# Patient Record
Sex: MALE | Race: WHITE | NOT HISPANIC OR LATINO | Employment: OTHER | URBAN - METROPOLITAN AREA
[De-identification: names, ages, dates, MRNs, and addresses within clinical notes are randomized per-mention and may not be internally consistent; named-entity substitution may affect disease eponyms.]

---

## 2017-10-10 DIAGNOSIS — Z00.00 ROUTINE GENERAL MEDICAL EXAMINATION AT A HEALTH CARE FACILITY: Primary | ICD-10-CM

## 2017-11-30 ENCOUNTER — HOSPITAL ENCOUNTER (OUTPATIENT)
Dept: CARDIOLOGY | Facility: CLINIC | Age: 67
Discharge: HOME | End: 2017-11-30
Payer: COMMERCIAL

## 2017-11-30 ENCOUNTER — OFFICE VISIT (OUTPATIENT)
Dept: INTERNAL MEDICINE | Facility: CLINIC | Age: 67
End: 2017-11-30
Payer: COMMERCIAL

## 2017-11-30 ENCOUNTER — CLINICAL SUPPORT (OUTPATIENT)
Dept: INTERNAL MEDICINE | Facility: CLINIC | Age: 67
End: 2017-11-30
Payer: COMMERCIAL

## 2017-11-30 VITALS
SYSTOLIC BLOOD PRESSURE: 125 MMHG | DIASTOLIC BLOOD PRESSURE: 80 MMHG | WEIGHT: 231.5 LBS | HEIGHT: 75 IN | BODY MASS INDEX: 28.78 KG/M2 | HEART RATE: 84 BPM

## 2017-11-30 DIAGNOSIS — Z00.00 ANNUAL PHYSICAL EXAM: Primary | ICD-10-CM

## 2017-11-30 DIAGNOSIS — Z00.00 ROUTINE GENERAL MEDICAL EXAMINATION AT A HEALTH CARE FACILITY: ICD-10-CM

## 2017-11-30 DIAGNOSIS — Z00.00 ROUTINE GENERAL MEDICAL EXAMINATION AT A HEALTH CARE FACILITY: Primary | ICD-10-CM

## 2017-11-30 PROBLEM — Z28.21 PNEUMOCOCCAL VACCINATION DECLINED: Status: ACTIVE | Noted: 2017-11-30

## 2017-11-30 LAB
ALBUMIN SERPL BCP-MCNC: 3.7 G/DL
ALP SERPL-CCNC: 52 U/L
ALT SERPL W/O P-5'-P-CCNC: 22 U/L
ANION GAP SERPL CALC-SCNC: 10 MMOL/L
AST SERPL-CCNC: 21 U/L
BILIRUB SERPL-MCNC: 0.7 MG/DL
BUN SERPL-MCNC: 13 MG/DL
CALCIUM SERPL-MCNC: 9.6 MG/DL
CHLORIDE SERPL-SCNC: 108 MMOL/L
CHOLEST SERPL-MCNC: 145 MG/DL
CHOLEST/HDLC SERPL: 2.9 {RATIO}
CO2 SERPL-SCNC: 28 MMOL/L
COMPLEXED PSA SERPL-MCNC: 0.57 NG/ML
CREAT SERPL-MCNC: 1 MG/DL
DIASTOLIC DYSFUNCTION: NO
ERYTHROCYTE [DISTWIDTH] IN BLOOD BY AUTOMATED COUNT: 12.5 %
EST. GFR  (AFRICAN AMERICAN): >60 ML/MIN/1.73 M^2
EST. GFR  (NON AFRICAN AMERICAN): >60 ML/MIN/1.73 M^2
ESTIMATED AVG GLUCOSE: 97 MG/DL
GLUCOSE SERPL-MCNC: 101 MG/DL
HBA1C MFR BLD HPLC: 5 %
HCT VFR BLD AUTO: 46.5 %
HCV AB SERPL QL IA: NEGATIVE
HDLC SERPL-MCNC: 50 MG/DL
HDLC SERPL: 34.5 %
HGB BLD-MCNC: 15.3 G/DL
LDLC SERPL CALC-MCNC: 81 MG/DL
MCH RBC QN AUTO: 31.3 PG
MCHC RBC AUTO-ENTMCNC: 32.9 G/DL
MCV RBC AUTO: 95 FL
NONHDLC SERPL-MCNC: 95 MG/DL
PLATELET # BLD AUTO: 168 K/UL
PMV BLD AUTO: 9.7 FL
POTASSIUM SERPL-SCNC: 4.4 MMOL/L
PROT SERPL-MCNC: 7.3 G/DL
RBC # BLD AUTO: 4.89 M/UL
SODIUM SERPL-SCNC: 146 MMOL/L
TRIGL SERPL-MCNC: 70 MG/DL
TSH SERPL DL<=0.005 MIU/L-ACNC: 1.54 UIU/ML
WBC # BLD AUTO: 5.54 K/UL

## 2017-11-30 PROCEDURE — 99999 PR PBB SHADOW E&M-EST. PATIENT-LVL III: CPT | Mod: PBBFAC,,, | Performed by: INTERNAL MEDICINE

## 2017-11-30 PROCEDURE — 83036 HEMOGLOBIN GLYCOSYLATED A1C: CPT

## 2017-11-30 PROCEDURE — 93015 CV STRESS TEST SUPVJ I&R: CPT | Mod: S$GLB,,, | Performed by: INTERNAL MEDICINE

## 2017-11-30 PROCEDURE — 84153 ASSAY OF PSA TOTAL: CPT

## 2017-11-30 PROCEDURE — 85027 COMPLETE CBC AUTOMATED: CPT

## 2017-11-30 PROCEDURE — 86803 HEPATITIS C AB TEST: CPT

## 2017-11-30 PROCEDURE — 80053 COMPREHEN METABOLIC PANEL: CPT

## 2017-11-30 PROCEDURE — 84443 ASSAY THYROID STIM HORMONE: CPT

## 2017-11-30 PROCEDURE — 99397 PER PM REEVAL EST PAT 65+ YR: CPT | Mod: S$GLB,,, | Performed by: INTERNAL MEDICINE

## 2017-11-30 PROCEDURE — 80061 LIPID PANEL: CPT

## 2017-11-30 NOTE — LETTER
"November 30, 2017        Jeremie Fitzpatrick MD  [address] [facility] [place] [address]             [facility] - Internal Medicine  [address]  [facility] [address]  Phone: [phone]  Fax: [phone]   Patient: Rakesh Lawrence   MR Number: [id number]   YOB: 1950   Date of Visit: 11/30/2017       Dear Dr. Fitzpatrick:    Thank you for referring Rakesh Lawrence to me for evaluation. Attached you will find relevant portions of my assessment and plan of care.    Of major importance is his stress test which revealed "At peak exercise, EKG revealed < 1mm of upsloping ST segment depression in the inferolateral leads. There were PACs and PVCs  and immediately in recovery a 10 beat run of wide complex tachycardia."    In the body of the report it indicates that one couldconsider checking electrolytes and thyroid if not done (those were done today, acceptable).    Whether Mr. Lawrence should have a CFD or Holter or EP assessment is something he would like to discuss with you.    If you have questions, please do not hesitate to call me. I look forward to following Rakesh Lawrence along with you.    Sincerely,          Naheed Rodriguez MD, FACP            CC  No Recipients    Enclosure         "

## 2017-11-30 NOTE — PROGRESS NOTES
Subjective:       Patient ID: Rakesh Lawrence is a 67 y.o. male.    Chief Complaint: Executive Health     PE    Had a colonoscopy 2/17- will be repeated in 3 years.    Declines flu shot.  Shingles vaccines discussed; he will wait on that.  He also defers on Prevnar and Tdap.    ETT today acceptable- however, did have a run of nonsustained V. tach.  This was reviewed.  He sees an outside cardiologist and would like to discuss this with his cardiologist.    Labs pending.     Patient Active Problem List:     Abnormal stress echocardiogram     Mixed hyperlipidemia     Paroxysmal VT     Cholelithiasis 2007 see ultrasound here no sx     Colon polyp: adenoma 2007; repeated wnl 1/11 in [place] also wnl in 2014, 2017     Elevated bilirubin since 2002 (1.3)     TIA (transient ischemic attack) 2014 workup in [place] negative        Review of Systems   Constitutional: Negative.    HENT: Negative for congestion, postnasal drip, rhinorrhea and sinus pressure.    Eyes: Negative for redness and visual disturbance.   Respiratory: Negative for apnea, choking, shortness of breath and stridor.    Cardiovascular: Negative for chest pain, palpitations and leg swelling.   Gastrointestinal: Negative for abdominal pain, constipation, diarrhea and nausea.   Genitourinary: Negative for difficulty urinating, flank pain, frequency, testicular pain and urgency.   Musculoskeletal: Negative for arthralgias, back pain and myalgias.   Skin: Negative for color change and rash.        No major issues- sees DERM annually in [place] and is up to date   Neurological: Negative.    Psychiatric/Behavioral: Negative.        Objective:      Physical Exam   Constitutional: He is oriented to person, place, and time. He appears well-developed and well-nourished.   HENT:   Head: Normocephalic and atraumatic.   Right Ear: External ear normal.   Left Ear: External ear normal.   Eyes: Conjunctivae and EOM are normal. Pupils are equal, round, and reactive to light.   Neck:  Normal range of motion. Neck supple. No thyromegaly present.   Cardiovascular: Normal rate and regular rhythm.    No murmur heard.  Pulmonary/Chest: Effort normal and breath sounds normal. No respiratory distress. He has no wheezes.   Abdominal: Soft. He exhibits no distension. There is no tenderness.   Musculoskeletal: He exhibits no edema or tenderness.   Lymphadenopathy:     He has no cervical adenopathy.   Neurological: He is alert and oriented to person, place, and time. No cranial nerve deficit.   Skin: Skin is warm and dry.   Psychiatric: He has a normal mood and affect. His behavior is normal.       Assessment:       1. Annual physical exam        Plan:         continue current medical regimen  Follow-up with cardiologist regarding ETT  Attempt at 5-10 point weight loss in the next year    Immunizations recommended, he currently declines

## 2017-11-30 NOTE — LETTER
November 30, 2017    Rakesh Lawrence  [address] [place] [address]  [address]  [place] [address]             [facility] - Internal Medicine  [address]  [facility] [address]  Phone: [phone]  Fax: [phone] Dear Mr. Lawrence:    Thank you for allowing me to serve you and perform your Executive Health exam on 11/30/2017.  This letter will serve a brief summary of the history, physical findings, and laboratory/studies performed and recommendations at that time.    Reason for Visit: Executive Health Preventive Physical Examination    Past Medical History:   Diagnosis Date    Cholelithiasis 2011 12/5/2013    Colon polyp: adenoma 2007; repeated wnl 1/11 in [place] due in 2014 12/5/2013    Elevated bilirubin since 2002 (1.3) 12/5/2013    Paroxysmal VT 1/4/2013    TIA (transient ischemic attack) 2014 workup in [place] negative 11/13/2014       Past Surgical History:   Procedure Laterality Date    EYE SURGERY      TONSILLECTOMY         Family History   Problem Relation Age of Onset    Heart disease Mother     Stroke Mother     Hypertension Mother     Cancer Father      lung     Cancer Sister      ? lung    COPD Sister     No Known Problems Daughter     No Known Problems Daughter     No Known Problems Daughter     Stroke Paternal Uncle     Hypertension Paternal Uncle             Review of patient's allergies indicates:  No Known Allergies      Current Outpatient Prescriptions:     aspirin (ECOTRIN) 81 MG EC tablet, Take 81 mg by mouth once daily.  , Disp: , Rfl:     clopidogrel (PLAVIX) 75 mg tablet, Take 75 mg by mouth once daily., Disp: , Rfl:     pravastatin (PRAVACHOL) 80 MG tablet, Take 1 tablet (80 mg total) by mouth once daily., Disp: 90 tablet, Rfl: 3     Review of Systems  Review of Systems - Negative     Physical Exam:  General: General appearance: alert, well appearing, and in no distress.   Skin: Skin exam - normal coloration and turgor, no rashes, no suspicious skin lesions noted.  HEENT:  Ears - bilateral TM's and external ear canals normal. , ENT exam reveals - ENT exam normal, no neck nodes or sinus tenderness.   Lungs: Chest: clear to auscultation, no wheezes, rales or rhonchi, symmetric air entry.   Heart: CVS exam: normal rate, regular rhythm, normal S1, S2, no murmurs, rubs, clicks or gallops.   Extremities: Exam of extremities: peripheral pulses normal, no pedal edema, no clubbing or cyanosis    Labs:  Acceptable.    As we discussed, stress test was acceptable other than in the recovery phase you had a short burst of arrhythmia.  This is similar to what you experience many years ago.  I would encourage you to review this with your cardiologist.  It may be valuable to consider a 24-48 hour Holter monitor and a visit with an arrhythmia specialist.  If you'd like to have that done here at [facility], please let me know.     Assessment/Recommendations:  Routine Health Maintenance is up-to-date.  I recommend a pneumonia vaccine series, an annual flu shot, a tetanus diphtheria pertussis booster, and a shingles vaccine (we discussed that there is a new shingles vaccine about to be available and that it is worth waiting for that new version.)    At this time, you appear to be in good medical condition.  I look forward to seeing you again next year.  Please continue to work on a healthy plant-based eating plan and regular exercise with an attempt at 5-10 pounds weight loss in the next year.  Please keep up with her regular dermatology and cardiology assessments.      Please contact me should you have any questions or concerns regarding physical findings, or my recommendations.        Sincerely,          Naheed Rodriguez MD, FACP

## 2017-11-30 NOTE — LETTER
November 30, 2017        No Recipients             [facility] - [facility] Medicine  [address]  [facility] [address]  Phone: [phone]  Fax: [phone]   Patient: Rakesh Lawrence   MR Number: [id number]   YOB: 1950   Date of Visit: 11/30/2017       Dear Dr. Posada Recipients:    Thank you for referring Rakesh Lawrence to me for evaluation. Attached you will find relevant portions of my assessment and plan of care.    If you have questions, please do not hesitate to call me. I look forward to following Rakesh Lawrence along with you.    Sincerely,      Naheed Rodriguez MD            CC  No Recipients    Enclosure

## 2017-11-30 NOTE — PATIENT INSTRUCTIONS
Prevention Guidelines, Men Ages 65 and Older  Screening tests and vaccines are an important part of managing your health. Health counseling is essential, too. Below are guidelines for these, for men ages 65 and older. Talk with your healthcare provider to make sure youre up-to-date on what you need.  Screening Who needs it How often   Abdominal aortic aneurysm Men ages 65 to 75 who have ever smoked 1 ultrasound   Alcohol misuse All men in this age group At routine exams   Blood pressure All men in this age group Every 2 years if your blood pressure is less than 120/80 mm Hg; yearly if your systolic blood pressure is 120 to 139 mm Hg, or your diastolic blood pressure reading is 80 to 89 mm Hg   Colorectal cancer All men in this age group Flexible sigmoidoscopy every 5 years, or colonoscopy every 10 years, or double-contrast barium enema every 5 years; yearly fecal occult blood test or fecal immunochemical test; or a stool DNA test as often as your healthcare provider advises; talk with your healthcare provider about which tests are best for you and when you no longer need colonoscopies (generally after age 75)   Depression All men in this age group At routine exams   Type 2 diabetes or prediabetes All adults beginning at age 45 and adults without symptoms at any age who are overweight or obese and have 1 or more other risk factors for diabetes At least every 3 years (yearly if your blood sugar has already begun to rise)   Hepatitis C Men at increased risk for infection - talk with your healthcare provider At routine exams   High cholesterol or triglycerides All men in this age group At least every 5 years   HIV Men at increased risk for infection - talk with your healthcare provider At routine exams   Lung cancer Adults ages 55 to 80 who have smoked Yearly screening in smokers with 30 pack-year history of smoking or who quit within 15 years   Obesity All men in this age group At routine exams   Prostate cancer All  men in this age group, talk to healthcare provider about risks and benefits of digital rectal exam (GEORGE) and prostate-specific antigen (PSA) screening1 At routine exams   Syphilis Men at increased risk for infection - talk with your healthcare provider At routine exams   Tuberculosis Men at increased risk for infection - talk with your healthcare provider Ask your healthcare provider   Vision All men in this age group Every 1 to 2 years; if you have a chronic health condition, ask your healthcare provider if you needs exams more often   Vaccine Who needs it How often   Chickenpox (varicella) All men in this age group who have no record of this infection or vaccine 2 doses; second dose should be given at least 4 weeks after the first dose   Hepatitis A Men at increased risk for infection - talk with your healthcare provider 2 doses given at least 6 months apart   Hepatitis B Men at increased risk for infection - talk with your healthcare provider 3 doses over 6 months; second dose should be given 1 month after the first dose; the third dose should be given at least 2 months after the second dose and at least 4 months after the first dose   Haemophilus influenzae Type B (HIB) Men at increased risk for infection - talk with your healthcare provider 1 to 3 doses   Influenza (flu) All men in this age group  Once a year   Meningococcal Men at increased risk for infection - talk with your healthcare provider 1 or more doses   Pneumococcal conjugate vaccine (PCV13) and pneumococcal polysaccharide vaccine (PPSV23) All men in this age group 1 dose of each vaccine   Tetanus/diphtheria/  pertussis (Td/Tdap) booster All men in this age group Td every 10 years, or Tdap if you will have contact with a child younger than 12 months old   Zoster All men in this age group 1 dose   Counseling Who needs it How often   Diet and exercise Men who are overweight or obese When diagnosed, and then at routine exams   Fall prevention (exercise,  vitamin D supplements) All men in this age group At routine exams   Sexually transmitted infection Men at increased risk for infection - talk with your healthcare provider At routine exams   Use of daily aspirin Men ages 45 to 79 at risk for cardiovascular health problems At routine exams   Use of tobacco and the health effects it can cause All men in this age group Every visit   [address] [place] [address] [facility]   Date Last Reviewed: 2017  © 7120-1745 [employer]. [address] [place] [address], [place] [address]. All rights reserved. This information is not intended as a substitute for professional medical care. Always follow your healthcare professional's instructions.        Adult Immunization Schedule  Vaccine How Often Disease Prevented Who Needs It   Influenza Every year Flu. This can be especially dangerous to elderly adults or people with immune disorders. All adults   Tetanus, diphtheria (Td); or Tetanus, diphtheria, and pertussis (Tdap)* One dose of Tdap, then one dose of Td as a booster every 10 years Tetanus (lockjaw), a disease that causes muscles to spasm  Diphtheria, an infection that causes fever, weakness, and breathing problems  Pertussis, also known as whooping cough. This is a highly contagious disease that can cause serious illness. All adults  *This vaccine should be given during each pregnancy no matter how many years since the last vaccine. The vaccine increases protection for your . A  is too young to get the vaccine, but at the highest risk for severe illness and death from pertussis.   Varicella (Sky)** One series of 2 injections Chickenpox. This is a disease that causes itchy skin bumps, fever, and tiredness. It can lead to scarring, pneumonia, or brain inflammation. Adults who dont have evidence of immunity  **This vaccine should not be given to pregnant women. Women should avoid pregnancy for 4 weeks after the vaccine.   Human papillomavirus (HPV) One  series of 3 injections Cervical cancer, caused by certain types of HPV  Vaginal and vulvar cancer  Penile cancer  Head and neck cancers  Anal cancer  Genital warts Females and males ages 15 to 26  Ask your healthcare provider if this vaccine is right for you.   Zoster--- 1 dose Herpes zoster (shingles), a painful rash marked by blisters Adults ages 60 and older.  ---You should not get this vaccine if your immune system is low. For example, if you have HIV, are taking medicines that suppress your immune system, or are getting cancer treatment.   Measles, mumps, rubella (MMR)** 1 or 2 doses, for ages 19 through 49; 1 dose for ages 50 and older if at risk Measles, a disease marked by red spots, fever, and coughing  Mumps, a disease that causes swelling in the salivary glands. It may affect the ovaries or testes.  Rubella ([place]ian measles). This is a form of measles that can cause birth defects if a pregnant woman catches it. Adults born in 1957 or later who are not known to be immune to all 3 of these diseases. Ask your healthcare provider if you need a second dose.  **This vaccine should not be given to pregnant women. Women should avoid pregnancy for 4 weeks after vaccination.   Pneumococcal (PCV 13) 1 dose Pneumonia. This is an infection that causes inflammation in your lungs. It can lead to death. Adults ages 65 and older. Also, adults ages 19 and older with weak immune systems or any of these health conditions: chronic renal failure, nephrotic syndrome, functional or anatomic asplenia, cerebrospinal fluid (CSF) leaks, or cochlear implants. This vaccine adds extra protection to PCV 23 and should be given about 2 months before PCV 23.   Pneumococcal (PPSV23)  1 or 2 doses Pneumonia. This is an infection that causes inflammation in your lungs. It can lead to death. Adults ages 65 and older. Also, adults with chronic illnesses, such as asthma, COPD, heart disease, diabetes, liver disease, alcoholism, sickle cell  disease, or history of splenectomy. In addition, adults with an immune disorder and adults who smoke cigarettes. This vaccine is recommended for all adults regardless of immune status.   Meningococcal  (MCV or MPSV) 1 or more doses Meningococcal disease (bacterial meningitis). This is inflammation of the membrane covering the brain and spinal cord. It can lead to death. Adults with immune deficiencies or high risk of exposure. Also, college freshmen living in dormitories and  recruits.   Hepatitis A (HepA) One series of 2 injections Hepatitis A. This is an infection that can result in acute liver inflammation and yellow skin and whites of the eyes (jaundice). Adults with risk factors, such as clotting disorders or chronic liver disease, and adults with high risk of exposure. This includes men who have sex with men, IV drug users, and travelers to countries where hepatitis A is common.   Hepatitis B (HepB) One series of 3 injections Hepatitis B. This is an infection that causes chronic, severe liver disease. Adults with high risk of exposure, such as healthcare providers and sanitation workers, and adults with diabetes   Travelers diseases As needed Infections such as cholera, typhoid, yellow fever, polio, rabies, meningococcal disease, hepatitis A, hepatitis B Adults traveling out of the country. Required vaccines will vary, depending on the country you visit. Check the CDC website: www.cdc.gov.    ,  Based on the CDC National Immunization Program recommendations for adults.  Date Last Reviewed: 2/1/2017  © 7449-7835 [employer]. [address] [place] [address], [place] [address]. All rights reserved. This information is not intended as a substitute for professional medical care. Always follow your healthcare professional's instructions.

## 2018-10-16 DIAGNOSIS — Z00.00 ROUTINE GENERAL MEDICAL EXAMINATION AT A HEALTH CARE FACILITY: Primary | ICD-10-CM

## 2018-11-29 ENCOUNTER — OFFICE VISIT (OUTPATIENT)
Dept: INTERNAL MEDICINE | Facility: CLINIC | Age: 68
End: 2018-11-29
Payer: MEDICARE

## 2018-11-29 ENCOUNTER — CLINICAL SUPPORT (OUTPATIENT)
Dept: INTERNAL MEDICINE | Facility: CLINIC | Age: 68
End: 2018-11-29
Payer: MEDICARE

## 2018-11-29 ENCOUNTER — HOSPITAL ENCOUNTER (OUTPATIENT)
Dept: CARDIOLOGY | Facility: CLINIC | Age: 68
Discharge: HOME | End: 2018-11-29
Payer: MEDICARE

## 2018-11-29 VITALS
BODY MASS INDEX: 29.8 KG/M2 | HEIGHT: 73 IN | DIASTOLIC BLOOD PRESSURE: 70 MMHG | SYSTOLIC BLOOD PRESSURE: 118 MMHG | WEIGHT: 224.88 LBS

## 2018-11-29 DIAGNOSIS — E11.9 DIABETES MELLITUS WITHOUT COMPLICATION: ICD-10-CM

## 2018-11-29 DIAGNOSIS — E03.9 PRIMARY HYPOTHYROIDISM: ICD-10-CM

## 2018-11-29 DIAGNOSIS — Z00.00 ROUTINE GENERAL MEDICAL EXAMINATION AT A HEALTH CARE FACILITY: ICD-10-CM

## 2018-11-29 DIAGNOSIS — D64.9 ANEMIA, UNSPECIFIED TYPE: ICD-10-CM

## 2018-11-29 DIAGNOSIS — E78.5 HYPERLIPIDEMIA, UNSPECIFIED HYPERLIPIDEMIA TYPE: Primary | ICD-10-CM

## 2018-11-29 DIAGNOSIS — Z12.5 SPECIAL SCREENING FOR MALIGNANT NEOPLASM OF PROSTATE: ICD-10-CM

## 2018-11-29 DIAGNOSIS — Z00.00 ANNUAL PHYSICAL EXAM: Primary | ICD-10-CM

## 2018-11-29 PROBLEM — Z28.21 PNEUMOCOCCAL VACCINATION DECLINED: Status: RESOLVED | Noted: 2017-11-30 | Resolved: 2018-11-29

## 2018-11-29 LAB
ALBUMIN SERPL BCP-MCNC: 3.7 G/DL
ALP SERPL-CCNC: 52 U/L
ALT SERPL W/O P-5'-P-CCNC: 24 U/L
ANION GAP SERPL CALC-SCNC: 8 MMOL/L
AST SERPL-CCNC: 24 U/L
BILIRUB SERPL-MCNC: 1 MG/DL
BUN SERPL-MCNC: 13 MG/DL
CALCIUM SERPL-MCNC: 9.5 MG/DL
CHLORIDE SERPL-SCNC: 104 MMOL/L
CHOLEST SERPL-MCNC: 149 MG/DL
CHOLEST/HDLC SERPL: 3 {RATIO}
CO2 SERPL-SCNC: 27 MMOL/L
COMPLEXED PSA SERPL-MCNC: 0.48 NG/ML
CREAT SERPL-MCNC: 1 MG/DL
ERYTHROCYTE [DISTWIDTH] IN BLOOD BY AUTOMATED COUNT: 12.5 %
EST. GFR  (AFRICAN AMERICAN): >60 ML/MIN/1.73 M^2
EST. GFR  (NON AFRICAN AMERICAN): >60 ML/MIN/1.73 M^2
ESTIMATED AVG GLUCOSE: 97 MG/DL
GLUCOSE SERPL-MCNC: 96 MG/DL
HBA1C MFR BLD HPLC: 5 %
HCT VFR BLD AUTO: 46.7 %
HDLC SERPL-MCNC: 50 MG/DL
HDLC SERPL: 33.6 %
HGB BLD-MCNC: 15.5 G/DL
LDLC SERPL CALC-MCNC: 83.6 MG/DL
MCH RBC QN AUTO: 31.1 PG
MCHC RBC AUTO-ENTMCNC: 33.2 G/DL
MCV RBC AUTO: 94 FL
NONHDLC SERPL-MCNC: 99 MG/DL
PLATELET # BLD AUTO: 171 K/UL
PMV BLD AUTO: 10.5 FL
POTASSIUM SERPL-SCNC: 4.3 MMOL/L
PROT SERPL-MCNC: 7.1 G/DL
RBC # BLD AUTO: 4.99 M/UL
SODIUM SERPL-SCNC: 139 MMOL/L
TRIGL SERPL-MCNC: 77 MG/DL
TSH SERPL DL<=0.005 MIU/L-ACNC: 1.15 UIU/ML
WBC # BLD AUTO: 5.97 K/UL

## 2018-11-29 PROCEDURE — 93005 ELECTROCARDIOGRAM TRACING: CPT | Mod: PBBFAC | Performed by: INTERNAL MEDICINE

## 2018-11-29 PROCEDURE — 90662 IIV NO PRSV INCREASED AG IM: CPT | Mod: PBBFAC

## 2018-11-29 PROCEDURE — 93010 ELECTROCARDIOGRAM REPORT: CPT | Mod: S$PBB,,, | Performed by: INTERNAL MEDICINE

## 2018-11-29 PROCEDURE — 85027 COMPLETE CBC AUTOMATED: CPT

## 2018-11-29 PROCEDURE — 80061 LIPID PANEL: CPT

## 2018-11-29 PROCEDURE — 84443 ASSAY THYROID STIM HORMONE: CPT

## 2018-11-29 PROCEDURE — 80053 COMPREHEN METABOLIC PANEL: CPT

## 2018-11-29 PROCEDURE — 90670 PCV13 VACCINE IM: CPT | Mod: PBBFAC

## 2018-11-29 PROCEDURE — 99397 PER PM REEVAL EST PAT 65+ YR: CPT | Mod: S$PBB,,, | Performed by: INTERNAL MEDICINE

## 2018-11-29 PROCEDURE — 99999 PR PBB SHADOW E&M-EST. PATIENT-LVL III: CPT | Mod: PBBFAC,,, | Performed by: INTERNAL MEDICINE

## 2018-11-29 PROCEDURE — 84153 ASSAY OF PSA TOTAL: CPT

## 2018-11-29 PROCEDURE — 83036 HEMOGLOBIN GLYCOSYLATED A1C: CPT

## 2018-11-29 PROCEDURE — 99213 OFFICE O/P EST LOW 20 MIN: CPT | Mod: PBBFAC,25 | Performed by: INTERNAL MEDICINE

## 2018-11-29 NOTE — PATIENT INSTRUCTIONS
Prevention Guidelines, Men Ages 65 and Older  Screening tests and vaccines are an important part of managing your health. Health counseling is essential, too. Below are guidelines for these, for men ages 65 and older. Talk with your healthcare provider to make sure youre up-to-date on what you need.  Screening Who needs it How often   Abdominal aortic aneurysm Men ages 65 to 75 who have ever smoked 1 ultrasound   Alcohol misuse All men in this age group At routine exams   Blood pressure All men in this age group Every 2 years if your blood pressure is less than 120/80 mm Hg; yearly if your systolic blood pressure is 120 to 139 mm Hg, or your diastolic blood pressure reading is 80 to 89 mm Hg   Colorectal cancer All men in this age group Flexible sigmoidoscopy every 5 years, or colonoscopy every 10 years, or double-contrast barium enema every 5 years; yearly fecal occult blood test or fecal immunochemical test; or a stool DNA test as often as your healthcare provider advises; talk with your healthcare provider about which tests are best for you and when you no longer need colonoscopies (generally after age 75)   Depression All men in this age group At routine exams   Type 2 diabetes or prediabetes All adults beginning at age 45 and adults without symptoms at any age who are overweight or obese and have 1 or more other risk factors for diabetes At least every 3 years (yearly if your blood sugar has already begun to rise)   Hepatitis C Men at increased risk for infection - talk with your healthcare provider At routine exams   High cholesterol or triglycerides All men in this age group At least every 5 years   HIV Men at increased risk for infection - talk with your healthcare provider At routine exams   Lung cancer Adults ages 55 to 80 who have smoked Yearly screening in smokers with 30 pack-year history of smoking or who quit within 15 years   Obesity All men in this age group At routine exams   Prostate cancer All  men in this age group, talk to healthcare provider about risks and benefits of digital rectal exam (GEORGE) and prostate-specific antigen (PSA) screening1 At routine exams   Syphilis Men at increased risk for infection - talk with your healthcare provider At routine exams   Tuberculosis Men at increased risk for infection - talk with your healthcare provider Ask your healthcare provider   Vision All men in this age group Every 1 to 2 years; if you have a chronic health condition, ask your healthcare provider if you needs exams more often   Vaccine Who needs it How often   Chickenpox (varicella) All men in this age group who have no record of this infection or vaccine 2 doses; second dose should be given at least 4 weeks after the first dose   Hepatitis A Men at increased risk for infection - talk with your healthcare provider 2 doses given at least 6 months apart   Hepatitis B Men at increased risk for infection - talk with your healthcare provider 3 doses over 6 months; second dose should be given 1 month after the first dose; the third dose should be given at least 2 months after the second dose and at least 4 months after the first dose   Haemophilus influenzae Type B (HIB) Men at increased risk for infection - talk with your healthcare provider 1 to 3 doses   Influenza (flu) All men in this age group  Once a year   Meningococcal Men at increased risk for infection - talk with your healthcare provider 1 or more doses   Pneumococcal conjugate vaccine (PCV13) and pneumococcal polysaccharide vaccine (PPSV23) All men in this age group 1 dose of each vaccine   Tetanus/diphtheria/  pertussis (Td/Tdap) booster All men in this age group Td every 10 years, or Tdap if you will have contact with a child younger than 12 months old   Zoster All men in this age group 1 dose   Counseling Who needs it How often   Diet and exercise Men who are overweight or obese When diagnosed, and then at routine exams   Fall prevention (exercise,  vitamin D supplements) All men in this age group At routine exams   Sexually transmitted infection Men at increased risk for infection - talk with your healthcare provider At routine exams   Use of daily aspirin Men ages 45 to 79 at risk for cardiovascular health problems At routine exams   Use of tobacco and the health effects it can cause All men in this age group Every visit   [address] [place] [address] [facility]   Date Last Reviewed: 2/1/2017  © 2659-4949 [employer], [employer]. [address] [place] [address], [place] [address]. All rights reserved. This information is not intended as a substitute for professional medical care. Always follow your healthcare professional's instructions.

## 2018-11-29 NOTE — PROGRESS NOTES
Subjective:       Patient ID: Rakesh Lawrence is a 68 y.o. male.    Chief Complaint: Executive Health     PE    Sees an outside cardiologist.  He was seen recently- goes every 6 months.   See last year's stress test, he had a run of V-tach.  It was recommended that a 2D echo with color flow Dopplers and EP assessment be considered, he had wanted to discuss this with his cardiologist.  He says his cardiologist did assess him.  He will see him on January 3rd 2019.   I have strongly encouraged him to review this with his cardiologist again.      Patient Active Problem List:     Abnormal stress echocardiogram     Mixed hyperlipidemia     Paroxysmal VT     Cholelithiasis 2007 see ultrasound here no sx     Colon polyp: adenoma 2007; 1/11 also 2014 and 2017 repeat 2020     Elevated bilirubin since 2002 (1.3)     TIA (transient ischemic attack) 2014 workup in [place] negative             Review of Systems   Constitutional: Negative.         Walks 6 miles 3 x weekly  Goes to gym  No sx   HENT: Positive for congestion. Negative for postnasal drip, rhinorrhea and sinus pressure.         Minimal slight congestion x 1 day  No ear ache or sore throat  No fever cough or SOB   Eyes: Negative for redness and visual disturbance.   Respiratory: Negative for apnea, choking, chest tightness, shortness of breath and stridor.    Cardiovascular: Negative for chest pain, palpitations and leg swelling.   Gastrointestinal: Negative for abdominal pain, constipation, diarrhea and nausea.   Genitourinary: Negative for difficulty urinating, flank pain, frequency, testicular pain and urgency.   Musculoskeletal: Negative for arthralgias, back pain and myalgias.   Skin: Negative for color change and rash.   Neurological: Negative.    Psychiatric/Behavioral: Negative.        Objective:      Physical Exam   Constitutional: He is oriented to person, place, and time. He appears well-developed and well-nourished.   HENT:   Head: Normocephalic and  atraumatic.   Right Ear: External ear normal.   Left Ear: External ear normal.   Eyes: Conjunctivae and EOM are normal.   Neck: Normal range of motion. Neck supple. No thyromegaly present.   Cardiovascular: Normal rate and regular rhythm.   No murmur heard.  Pulmonary/Chest: Effort normal and breath sounds normal. No respiratory distress. He has no wheezes.   Abdominal: Soft. He exhibits no distension. There is no tenderness.   Musculoskeletal: He exhibits no edema or tenderness.   Lymphadenopathy:     He has no cervical adenopathy.   Neurological: He is alert and oriented to person, place, and time. No cranial nerve deficit.   Skin: Skin is warm and dry.   Psychiatric: He has a normal mood and affect. His behavior is normal.       Assessment:       1. Annual physical exam        Plan:         EKG acceptable  Labs reviewed  Immunizations discussed, he will consider  5-10 lb weight loss in the next year  Keep Cardiology follow-up

## 2018-11-29 NOTE — LETTER
November 29, 2018    Rakesh Lawrence  [address] [place] [address]  [address]  [place] [address]             [facility]  [address]  [facility] [address]  Phone: [phone]  Fax: [phone] Dear Mr. Lawrence:    Thank you for allowing me to serve you and perform your Executive Health exam on 11/29/2018.  This letter will serve a brief summary of the history, physical findings, and laboratory/studies performed and recommendations at that time.    Reason for Visit: Executive Health Preventive Physical Examination    Past Medical History:   Diagnosis Date    Cholelithiasis 2011 12/5/2013    Colon polyp: adenoma 2007; repeated wnl 1/11 in [place] due in 2014 12/5/2013    Elevated bilirubin since 2002 (1.3) 12/5/2013    Paroxysmal VT 1/4/2013    Pneumococcal vaccination declined 11/30/2017    TIA (transient ischemic attack) 2014 workup in [place] negative 11/13/2014       Past Surgical History:   Procedure Laterality Date    EYE SURGERY      TONSILLECTOMY         Family History   Problem Relation Age of Onset    Heart disease Mother     Stroke Mother     Hypertension Mother     Cancer Father         lung     Cancer Sister         ? lung    COPD Sister     No Known Problems Daughter     No Known Problems Daughter     No Known Problems Daughter     Stroke Paternal Uncle     Hypertension Paternal Uncle             Review of patient's allergies indicates:  No Known Allergies      Current Outpatient Medications:     aspirin (ECOTRIN) 81 MG EC tablet, Take 81 mg by mouth once daily.  , Disp: , Rfl:     clopidogrel (PLAVIX) 75 mg tablet, Take 75 mg by mouth once daily., Disp: , Rfl:     pravastatin (PRAVACHOL) 80 MG tablet, Take 1 tablet (80 mg total) by mouth once daily., Disp: 90 tablet, Rfl: 3     Review of Systems  Review of Systems - Negative except for minimal upper respiratory symptoms x 1 day.    Physical Exam:  General: General appearance: alert, well appearing, and in no  distress.   Skin: Skin exam - normal coloration and turgor, no rashes, no suspicious skin lesions noted.  HEENT: Ears - bilateral TM's and external ear canals normal. , ENT exam reveals - ENT exam normal, no neck nodes or sinus tenderness.   Lungs: Chest: clear to auscultation, no wheezes, rales or rhonchi, symmetric air entry.   Heart: CVS exam: normal rate, regular rhythm, normal S1, S2, no murmurs, rubs, clicks or gallops.   Extremities: Exam of extremities: peripheral pulses normal, no pedal edema, no clubbing or cyanosis    Labs:  Results for orders placed or performed in visit on 11/29/18   Comprehensive metabolic panel   Result Value Ref Range    Sodium 139 136 - 145 mmol/L    Potassium 4.3 3.5 - 5.1 mmol/L    Chloride 104 95 - 110 mmol/L    CO2 27 23 - 29 mmol/L    Glucose 96 70 - 110 mg/dL    BUN, Bld 13 8 - 23 mg/dL    Creatinine 1.0 0.5 - 1.4 mg/dL    Calcium 9.5 8.7 - 10.5 mg/dL    Total Protein 7.1 6.0 - 8.4 g/dL    Albumin 3.7 3.5 - 5.2 g/dL    Total Bilirubin 1.0 0.1 - 1.0 mg/dL    Alkaline Phosphatase 52 (L) 55 - 135 U/L    AST 24 10 - 40 U/L    ALT 24 10 - 44 U/L    Anion Gap 8 8 - 16 mmol/L    eGFR if African American >60.0 >60 mL/min/1.73 m^2    eGFR if non African American >60.0 >60 mL/min/1.73 m^2   CBC Without Differential   Result Value Ref Range    WBC 5.97 3.90 - 12.70 K/uL    RBC 4.99 4.60 - 6.20 M/uL    Hemoglobin 15.5 14.0 - 18.0 g/dL    Hematocrit 46.7 40.0 - 54.0 %    MCV 94 82 - 98 fL    MCH 31.1 (H) 27.0 - 31.0 pg    MCHC 33.2 32.0 - 36.0 g/dL    RDW 12.5 11.5 - 14.5 %    Platelets 171 150 - 350 K/uL    MPV 10.5 9.2 - 12.9 fL   Lipid panel   Result Value Ref Range    Cholesterol 149 120 - 199 mg/dL    Triglycerides 77 30 - 150 mg/dL    HDL 50 40 - 75 mg/dL    LDL Cholesterol 83.6 63.0 - 159.0 mg/dL    HDL/Chol Ratio 33.6 20.0 - 50.0 %    Total Cholesterol/HDL Ratio 3.0 2.0 - 5.0    Non-HDL Cholesterol 99 mg/dL   TSH   Result Value Ref Range    TSH 1.154 0.400 - 4.000 uIU/mL   PSA,  Screening (every year)   Result Value Ref Range    PSA, SCREEN 0.48 0.00 - 4.00 ng/mL   Hemoglobin A1c   Result Value Ref Range    Hemoglobin A1C 5.0 4.0 - 5.6 %    Estimated Avg Glucose 97 68 - 131 mg/dL      EKG was acceptable.    Assessment/Recommendations:  Routine Health Maintenance:  I recommend an annual flu vaccine as well as the pneumonia vaccine series, tetanus diphtheria pertussis booster every 10 years, and the new shingles vaccine series.    At this time, you appear to be in good medical condition.   Fluids, Mucinex and Tylenol should be adequate for your upper respiratory symptoms.  This may persist for 7-10 days.  As long as symptoms are not worsening, no further assessment should be indicated.    Please try to work on regular exercise and 5-10 lb weight loss in the next year.    Please review the findings from your stress test from last year with your cardiologist.  It appears he is already familiar with these findings.  If I can be of any assistance with any further cardiology assessment, please let me know  I look forward to seeing you again next year.  Please contact me should you have any questions or concerns regarding physical findings, or my recommendations.        Sincerely,            Naheed Rodriguez MD, FACP

## 2019-10-22 DIAGNOSIS — R06.00 DYSPNEA, UNSPECIFIED TYPE: Primary | ICD-10-CM

## 2019-11-21 ENCOUNTER — HOSPITAL ENCOUNTER (OUTPATIENT)
Dept: CARDIOLOGY | Facility: CLINIC | Age: 69
Discharge: HOME | End: 2019-11-21
Payer: MEDICARE

## 2019-11-21 ENCOUNTER — CLINICAL SUPPORT (OUTPATIENT)
Dept: INTERNAL MEDICINE | Facility: CLINIC | Age: 69
End: 2019-11-21
Payer: MEDICARE

## 2019-11-21 ENCOUNTER — IMMUNIZATION (OUTPATIENT)
Dept: INTERNAL MEDICINE | Facility: CLINIC | Age: 69
End: 2019-11-21
Payer: MEDICARE

## 2019-11-21 ENCOUNTER — OFFICE VISIT (OUTPATIENT)
Dept: INTERNAL MEDICINE | Facility: CLINIC | Age: 69
End: 2019-11-21
Payer: MEDICARE

## 2019-11-21 VITALS
BODY MASS INDEX: 29.69 KG/M2 | HEART RATE: 72 BPM | HEIGHT: 73 IN | DIASTOLIC BLOOD PRESSURE: 78 MMHG | SYSTOLIC BLOOD PRESSURE: 110 MMHG | WEIGHT: 224 LBS

## 2019-11-21 DIAGNOSIS — R79.9 ABNORMAL FINDING OF BLOOD CHEMISTRY, UNSPECIFIED: ICD-10-CM

## 2019-11-21 DIAGNOSIS — R06.00 DYSPNEA, UNSPECIFIED TYPE: ICD-10-CM

## 2019-11-21 DIAGNOSIS — Z11.4 ENCOUNTER FOR SCREENING FOR HUMAN IMMUNODEFICIENCY VIRUS (HIV): ICD-10-CM

## 2019-11-21 DIAGNOSIS — Z12.5 ENCOUNTER FOR SCREENING FOR MALIGNANT NEOPLASM OF PROSTATE: ICD-10-CM

## 2019-11-21 DIAGNOSIS — R73.03 PREDIABETES: ICD-10-CM

## 2019-11-21 DIAGNOSIS — Z00.00 ANNUAL PHYSICAL EXAM: Primary | ICD-10-CM

## 2019-11-21 DIAGNOSIS — Z00.00 ANNUAL PHYSICAL EXAM: ICD-10-CM

## 2019-11-21 LAB
ALBUMIN SERPL BCP-MCNC: 3.7 G/DL (ref 3.5–5.2)
ALP SERPL-CCNC: 55 U/L (ref 55–135)
ALT SERPL W/O P-5'-P-CCNC: 23 U/L (ref 10–44)
ANION GAP SERPL CALC-SCNC: 5 MMOL/L (ref 8–16)
AST SERPL-CCNC: 23 U/L (ref 10–40)
BILIRUB SERPL-MCNC: 1.7 MG/DL (ref 0.1–1)
BUN SERPL-MCNC: 15 MG/DL (ref 8–23)
CALCIUM SERPL-MCNC: 9.4 MG/DL (ref 8.7–10.5)
CHLORIDE SERPL-SCNC: 106 MMOL/L (ref 95–110)
CHOLEST SERPL-MCNC: 132 MG/DL (ref 120–199)
CHOLEST/HDLC SERPL: 3.2 {RATIO} (ref 2–5)
CO2 SERPL-SCNC: 28 MMOL/L (ref 23–29)
COMPLEXED PSA SERPL-MCNC: 0.64 NG/ML (ref 0–4)
CREAT SERPL-MCNC: 1 MG/DL (ref 0.5–1.4)
ERYTHROCYTE [DISTWIDTH] IN BLOOD BY AUTOMATED COUNT: 11.9 % (ref 11.5–14.5)
EST. GFR  (AFRICAN AMERICAN): >60 ML/MIN/1.73 M^2
EST. GFR  (NON AFRICAN AMERICAN): >60 ML/MIN/1.73 M^2
ESTIMATED AVG GLUCOSE: 100 MG/DL (ref 68–131)
GLUCOSE SERPL-MCNC: 97 MG/DL (ref 70–110)
HBA1C MFR BLD HPLC: 5.1 % (ref 4–5.6)
HCT VFR BLD AUTO: 44.7 % (ref 40–54)
HDLC SERPL-MCNC: 41 MG/DL (ref 40–75)
HDLC SERPL: 31.1 % (ref 20–50)
HGB BLD-MCNC: 15.1 G/DL (ref 14–18)
HIV 1+2 AB+HIV1 P24 AG SERPL QL IA: NEGATIVE
LDLC SERPL CALC-MCNC: 72.2 MG/DL (ref 63–159)
MCH RBC QN AUTO: 32.3 PG (ref 27–31)
MCHC RBC AUTO-ENTMCNC: 33.8 G/DL (ref 32–36)
MCV RBC AUTO: 96 FL (ref 82–98)
NONHDLC SERPL-MCNC: 91 MG/DL
PLATELET # BLD AUTO: 178 K/UL (ref 150–350)
PMV BLD AUTO: 10 FL (ref 9.2–12.9)
POTASSIUM SERPL-SCNC: 4.3 MMOL/L (ref 3.5–5.1)
PROT SERPL-MCNC: 7 G/DL (ref 6–8.4)
RBC # BLD AUTO: 4.68 M/UL (ref 4.6–6.2)
SODIUM SERPL-SCNC: 139 MMOL/L (ref 136–145)
TRIGL SERPL-MCNC: 94 MG/DL (ref 30–150)
TSH SERPL DL<=0.005 MIU/L-ACNC: 0.9 UIU/ML (ref 0.4–4)
WBC # BLD AUTO: 5.19 K/UL (ref 3.9–12.7)

## 2019-11-21 PROCEDURE — 90662 IIV NO PRSV INCREASED AG IM: CPT | Mod: PBBFAC

## 2019-11-21 PROCEDURE — 93010 EKG 12-LEAD: ICD-10-PCS | Mod: S$PBB,,, | Performed by: INTERNAL MEDICINE

## 2019-11-21 PROCEDURE — G0009 ADMIN PNEUMOCOCCAL VACCINE: HCPCS | Mod: PBBFAC

## 2019-11-21 PROCEDURE — 93010 ELECTROCARDIOGRAM REPORT: CPT | Mod: S$PBB,,, | Performed by: INTERNAL MEDICINE

## 2019-11-21 PROCEDURE — 99999 PR PBB SHADOW E&M-EST. PATIENT-LVL III: ICD-10-PCS | Mod: PBBFAC,,, | Performed by: INTERNAL MEDICINE

## 2019-11-21 PROCEDURE — 99397 PER PM REEVAL EST PAT 65+ YR: CPT | Mod: S$PBB,,, | Performed by: INTERNAL MEDICINE

## 2019-11-21 PROCEDURE — 83036 HEMOGLOBIN GLYCOSYLATED A1C: CPT

## 2019-11-21 PROCEDURE — 99999 PR PBB SHADOW E&M-EST. PATIENT-LVL III: CPT | Mod: PBBFAC,,, | Performed by: INTERNAL MEDICINE

## 2019-11-21 PROCEDURE — 84153 ASSAY OF PSA TOTAL: CPT

## 2019-11-21 PROCEDURE — 80053 COMPREHEN METABOLIC PANEL: CPT

## 2019-11-21 PROCEDURE — 93005 ELECTROCARDIOGRAM TRACING: CPT | Mod: PBBFAC | Performed by: INTERNAL MEDICINE

## 2019-11-21 PROCEDURE — 99397 PR PREVENTIVE VISIT,EST,65 & OVER: ICD-10-PCS | Mod: S$PBB,,, | Performed by: INTERNAL MEDICINE

## 2019-11-21 PROCEDURE — 84443 ASSAY THYROID STIM HORMONE: CPT

## 2019-11-21 PROCEDURE — 85027 COMPLETE CBC AUTOMATED: CPT

## 2019-11-21 PROCEDURE — 80061 LIPID PANEL: CPT

## 2019-11-21 PROCEDURE — 86703 HIV-1/HIV-2 1 RESULT ANTBDY: CPT

## 2019-11-21 PROCEDURE — 99213 OFFICE O/P EST LOW 20 MIN: CPT | Mod: PBBFAC,25 | Performed by: INTERNAL MEDICINE

## 2019-11-21 RX ORDER — PRAVASTATIN SODIUM 80 MG/1
80 TABLET ORAL DAILY
Qty: 90 TABLET | Refills: 3 | Status: SHIPPED | OUTPATIENT
Start: 2019-11-21 | End: 2022-03-08 | Stop reason: SDUPTHER

## 2019-11-21 NOTE — PATIENT INSTRUCTIONS
Prevention Guidelines, Men Ages 65 and Older  Screening tests and vaccines are an important part of managing your health. Health counseling is essential, too. Below are guidelines for these, for men ages 65 and older. Talk with your healthcare provider to make sure youre up-to-date on what you need.  Screening Who needs it How often   Abdominal aortic aneurysm Men ages 65 to 75 who have ever smoked 1 ultrasound   Alcohol misuse All men in this age group At routine exams   Blood pressure All men in this age group Every 2 years if your blood pressure is less than 120/80 mm Hg; yearly if your systolic blood pressure is 120 to 139 mm Hg, or your diastolic blood pressure reading is 80 to 89 mm Hg   Colorectal cancer All men in this age group Flexible sigmoidoscopy every 5 years, or colonoscopy every 10 years, or double-contrast barium enema every 5 years; yearly fecal occult blood test or fecal immunochemical test; or a stool DNA test as often as your healthcare provider advises; talk with your healthcare provider about which tests are best for you and when you no longer need colonoscopies (generally after age 75)   Depression All men in this age group At routine exams   Type 2 diabetes or prediabetes All adults beginning at age 45 and adults without symptoms at any age who are overweight or obese and have 1 or more other risk factors for diabetes At least every 3 years (yearly if your blood sugar has already begun to rise)   Hepatitis C Men at increased risk for infection - talk with your healthcare provider At routine exams   High cholesterol or triglycerides All men in this age group At least every 5 years   HIV Men at increased risk for infection - talk with your healthcare provider At routine exams   Lung cancer Adults ages 55 to 80 who have smoked Yearly screening in smokers with 30 pack-year history of smoking or who quit within 15 years   Obesity All men in this age group At routine exams   Prostate cancer All  men in this age group, talk to healthcare provider about risks and benefits of digital rectal exam (GEORGE) and prostate-specific antigen (PSA) screening1 At routine exams   Syphilis Men at increased risk for infection - talk with your healthcare provider At routine exams   Tuberculosis Men at increased risk for infection - talk with your healthcare provider Ask your healthcare provider   Vision All men in this age group Every 1 to 2 years; if you have a chronic health condition, ask your healthcare provider if you needs exams more often   Vaccine Who needs it How often   Chickenpox (varicella) All men in this age group who have no record of this infection or vaccine 2 doses; second dose should be given at least 4 weeks after the first dose   Hepatitis A Men at increased risk for infection - talk with your healthcare provider 2 doses given at least 6 months apart   Hepatitis B Men at increased risk for infection - talk with your healthcare provider 3 doses over 6 months; second dose should be given 1 month after the first dose; the third dose should be given at least 2 months after the second dose and at least 4 months after the first dose   Haemophilus influenzae Type B (HIB) Men at increased risk for infection - talk with your healthcare provider 1 to 3 doses   Influenza (flu) All men in this age group  Once a year   Meningococcal Men at increased risk for infection - talk with your healthcare provider 1 or more doses   Pneumococcal conjugate vaccine (PCV13) and pneumococcal polysaccharide vaccine (PPSV23) All men in this age group 1 dose of each vaccine   Tetanus/diphtheria/  pertussis (Td/Tdap) booster All men in this age group Td every 10 years, or Tdap if you will have contact with a child younger than 12 months old   Zoster All men in this age group 1 dose   Counseling Who needs it How often   Diet and exercise Men who are overweight or obese When diagnosed, and then at routine exams   Fall prevention (exercise,  vitamin D supplements) All men in this age group At routine exams   Sexually transmitted infection Men at increased risk for infection - talk with your healthcare provider At routine exams   Use of daily aspirin Men ages 45 to 79 at risk for cardiovascular health problems At routine exams   Use of tobacco and the health effects it can cause All men in this age group Every visit   [address] [place] [address] [facility]   Date Last Reviewed: 2/1/2017  © 0837-1730 [employer]. [address] [place] [address], [place] [address]. All rights reserved. This information is not intended as a substitute for professional medical care. Always follow your healthcare professional's instructions.

## 2019-11-21 NOTE — PROGRESS NOTES
Subjective:       Patient ID: Rakesh Lawrence is a 69 y.o. male.    Chief Complaint: Annual Exam    EH PE     Sees an outside cardiologist.  He was seen recently- goes every 6 months.   Last visit was this past summer.    His 2017 stress test, he had a run of V-tach.  (Nuclear ETT acceptable 2012)  It was recommended that a 2D echo with color flow Dopplers and EP assessment be considered, he says his cardiologist did assess him, all acceptable.       Patient Active Problem List:     Abnormal stress echocardiogram 2017     Mixed hyperlipidemia     Paroxysmal VT     Cholelithiasis 2007 see ultrasound here no sx     Colon polyp: adenoma 2007; 1/11 also 2014 and 2017 repeat 2020     Elevated bilirubin since 2002 (1.3)     TIA (transient ischemic attack) 2014 workup in [place] negative      Review of Systems   Constitutional: Negative.    HENT: Negative for congestion, postnasal drip, rhinorrhea and sinus pressure.    Eyes: Negative for redness and visual disturbance.   Respiratory: Negative for apnea, choking, shortness of breath and stridor.    Cardiovascular: Negative for chest pain, palpitations and leg swelling.   Gastrointestinal: Negative for abdominal pain, constipation, diarrhea and nausea.        No sx- gallstones reviewed   Genitourinary: Negative for difficulty urinating, flank pain, frequency, testicular pain and urgency.   Musculoskeletal: Negative for arthralgias, back pain and myalgias.   Skin: Negative for color change and rash.   Neurological: Negative.    Psychiatric/Behavioral: Negative.        Objective:      Physical Exam   Constitutional: He is oriented to person, place, and time. He appears well-developed and well-nourished.   HENT:   Head: Normocephalic and atraumatic.   Right Ear: External ear normal.   Left Ear: External ear normal.   Eyes: Conjunctivae and EOM are normal.   Neck: Normal range of motion. Neck supple. No thyromegaly present.   Cardiovascular: Normal rate and regular rhythm.    No murmur heard.  Pulmonary/Chest: Effort normal and breath sounds normal. No respiratory distress. He has no wheezes.   Abdominal: Soft. He exhibits no distension. There is no tenderness.   Musculoskeletal: He exhibits no edema or tenderness.   Lymphadenopathy:     He has no cervical adenopathy.   Neurological: He is alert and oriented to person, place, and time. No cranial nerve deficit.   Skin: Skin is warm and dry.   Psychiatric: He has a normal mood and affect. His behavior is normal.       Assessment:       1. Annual physical exam        Plan:         Flu shot and Prevnar 13 today    Tdap and Shingles at a later time    Keep Cardiology and DERM appts in [place]    Colonoscopy    I will review all studies and determine further tx depending on findings    10-15 # weight loss in next year

## 2019-12-10 ENCOUNTER — TELEPHONE (OUTPATIENT)
Dept: INTERNAL MEDICINE | Facility: CLINIC | Age: 69
End: 2019-12-10

## 2019-12-10 NOTE — TELEPHONE ENCOUNTER
----- Message from Kamlesh Hopkins sent at 12/10/2019  4:01 PM CST -----  Contact: Self [phone]  Patient will like to inform the doctor on 3/7/2017 last colonoscopy, please advise

## 2020-09-30 DIAGNOSIS — R06.00 DYSPNEA, UNSPECIFIED TYPE: Primary | ICD-10-CM

## 2020-10-08 ENCOUNTER — IMMUNIZATION (OUTPATIENT)
Dept: PHARMACY | Facility: CLINIC | Age: 70
End: 2020-10-08
Payer: MEDICARE

## 2020-10-08 ENCOUNTER — IMMUNIZATION (OUTPATIENT)
Dept: INTERNAL MEDICINE | Facility: CLINIC | Age: 70
End: 2020-10-08
Payer: MEDICARE

## 2020-10-08 ENCOUNTER — OFFICE VISIT (OUTPATIENT)
Dept: INTERNAL MEDICINE | Facility: CLINIC | Age: 70
End: 2020-10-08
Payer: MEDICARE

## 2020-10-08 ENCOUNTER — CLINICAL SUPPORT (OUTPATIENT)
Dept: INTERNAL MEDICINE | Facility: CLINIC | Age: 70
End: 2020-10-08
Payer: MEDICARE

## 2020-10-08 ENCOUNTER — HOSPITAL ENCOUNTER (OUTPATIENT)
Dept: CARDIOLOGY | Facility: CLINIC | Age: 70
Discharge: HOME | End: 2020-10-08
Payer: MEDICARE

## 2020-10-08 VITALS
SYSTOLIC BLOOD PRESSURE: 110 MMHG | HEIGHT: 73 IN | DIASTOLIC BLOOD PRESSURE: 60 MMHG | WEIGHT: 209.44 LBS | BODY MASS INDEX: 27.76 KG/M2

## 2020-10-08 DIAGNOSIS — R94.39 ABNORMAL STRESS ECHOCARDIOGRAM: ICD-10-CM

## 2020-10-08 DIAGNOSIS — R73.09 IMPAIRED GLUCOSE TOLERANCE TEST: ICD-10-CM

## 2020-10-08 DIAGNOSIS — R53.83 FATIGUE, UNSPECIFIED TYPE: ICD-10-CM

## 2020-10-08 DIAGNOSIS — K80.20 CALCULUS OF GALLBLADDER WITHOUT CHOLECYSTITIS WITHOUT OBSTRUCTION: ICD-10-CM

## 2020-10-08 DIAGNOSIS — K63.5 POLYP OF COLON, UNSPECIFIED PART OF COLON, UNSPECIFIED TYPE: ICD-10-CM

## 2020-10-08 DIAGNOSIS — R79.9 ABNORMAL BLOOD CHEMISTRY: ICD-10-CM

## 2020-10-08 DIAGNOSIS — E78.2 MIXED HYPERLIPIDEMIA: ICD-10-CM

## 2020-10-08 DIAGNOSIS — I47.29 PAROXYSMAL VT: ICD-10-CM

## 2020-10-08 DIAGNOSIS — R06.00 DYSPNEA, UNSPECIFIED TYPE: ICD-10-CM

## 2020-10-08 DIAGNOSIS — Z00.00 ANNUAL PHYSICAL EXAM: Primary | ICD-10-CM

## 2020-10-08 DIAGNOSIS — R53.0 NEOPLASTIC MALIGNANT RELATED FATIGUE: ICD-10-CM

## 2020-10-08 DIAGNOSIS — E78.5 HYPERLIPIDEMIA, UNSPECIFIED HYPERLIPIDEMIA TYPE: Primary | ICD-10-CM

## 2020-10-08 DIAGNOSIS — Z12.5 SPECIAL SCREENING FOR MALIGNANT NEOPLASM OF PROSTATE: ICD-10-CM

## 2020-10-08 LAB
ALBUMIN SERPL BCP-MCNC: 3.9 G/DL (ref 3.5–5.2)
ALP SERPL-CCNC: 48 U/L (ref 55–135)
ALT SERPL W/O P-5'-P-CCNC: 19 U/L (ref 10–44)
ANION GAP SERPL CALC-SCNC: 7 MMOL/L (ref 8–16)
AST SERPL-CCNC: 19 U/L (ref 10–40)
BILIRUB SERPL-MCNC: 1.2 MG/DL (ref 0.1–1)
BUN SERPL-MCNC: 13 MG/DL (ref 8–23)
CALCIUM SERPL-MCNC: 9.3 MG/DL (ref 8.7–10.5)
CHLORIDE SERPL-SCNC: 106 MMOL/L (ref 95–110)
CHOLEST SERPL-MCNC: 131 MG/DL (ref 120–199)
CHOLEST/HDLC SERPL: 2.6 {RATIO} (ref 2–5)
CO2 SERPL-SCNC: 28 MMOL/L (ref 23–29)
COMPLEXED PSA SERPL-MCNC: 0.46 NG/ML (ref 0–4)
CREAT SERPL-MCNC: 1 MG/DL (ref 0.5–1.4)
ERYTHROCYTE [DISTWIDTH] IN BLOOD BY AUTOMATED COUNT: 12.4 % (ref 11.5–14.5)
EST. GFR  (AFRICAN AMERICAN): >60 ML/MIN/1.73 M^2
EST. GFR  (NON AFRICAN AMERICAN): >60 ML/MIN/1.73 M^2
ESTIMATED AVG GLUCOSE: 91 MG/DL (ref 68–131)
GLUCOSE SERPL-MCNC: 95 MG/DL (ref 70–110)
HBA1C MFR BLD HPLC: 4.8 % (ref 4–5.6)
HCT VFR BLD AUTO: 46.8 % (ref 40–54)
HDLC SERPL-MCNC: 50 MG/DL (ref 40–75)
HDLC SERPL: 38.2 % (ref 20–50)
HGB BLD-MCNC: 15.2 G/DL (ref 14–18)
LDLC SERPL CALC-MCNC: 67.8 MG/DL (ref 63–159)
MCH RBC QN AUTO: 31.7 PG (ref 27–31)
MCHC RBC AUTO-ENTMCNC: 32.5 G/DL (ref 32–36)
MCV RBC AUTO: 98 FL (ref 82–98)
NONHDLC SERPL-MCNC: 81 MG/DL
PLATELET # BLD AUTO: 176 K/UL (ref 150–350)
PMV BLD AUTO: 10.4 FL (ref 9.2–12.9)
POTASSIUM SERPL-SCNC: 4.2 MMOL/L (ref 3.5–5.1)
PROT SERPL-MCNC: 7.1 G/DL (ref 6–8.4)
RBC # BLD AUTO: 4.8 M/UL (ref 4.6–6.2)
SODIUM SERPL-SCNC: 141 MMOL/L (ref 136–145)
TRIGL SERPL-MCNC: 66 MG/DL (ref 30–150)
TSH SERPL DL<=0.005 MIU/L-ACNC: 0.91 UIU/ML (ref 0.4–4)
WBC # BLD AUTO: 5.4 K/UL (ref 3.9–12.7)

## 2020-10-08 PROCEDURE — 93010 EKG 12-LEAD: ICD-10-PCS | Mod: S$PBB,,, | Performed by: INTERNAL MEDICINE

## 2020-10-08 PROCEDURE — 93005 ELECTROCARDIOGRAM TRACING: CPT | Mod: PBBFAC | Performed by: INTERNAL MEDICINE

## 2020-10-08 PROCEDURE — 99397 PER PM REEVAL EST PAT 65+ YR: CPT | Mod: S$PBB,,, | Performed by: INTERNAL MEDICINE

## 2020-10-08 PROCEDURE — 99999 PR PBB SHADOW E&M-EST. PATIENT-LVL III: CPT | Mod: PBBFAC,,, | Performed by: INTERNAL MEDICINE

## 2020-10-08 PROCEDURE — G0008 ADMIN INFLUENZA VIRUS VAC: HCPCS | Mod: PBBFAC

## 2020-10-08 PROCEDURE — 84443 ASSAY THYROID STIM HORMONE: CPT

## 2020-10-08 PROCEDURE — 84153 ASSAY OF PSA TOTAL: CPT

## 2020-10-08 PROCEDURE — 99397 PR PREVENTIVE VISIT,EST,65 & OVER: ICD-10-PCS | Mod: S$PBB,,, | Performed by: INTERNAL MEDICINE

## 2020-10-08 PROCEDURE — 93010 ELECTROCARDIOGRAM REPORT: CPT | Mod: S$PBB,,, | Performed by: INTERNAL MEDICINE

## 2020-10-08 PROCEDURE — 85027 COMPLETE CBC AUTOMATED: CPT

## 2020-10-08 PROCEDURE — 80053 COMPREHEN METABOLIC PANEL: CPT

## 2020-10-08 PROCEDURE — 99999 PR PBB SHADOW E&M-EST. PATIENT-LVL III: ICD-10-PCS | Mod: PBBFAC,,, | Performed by: INTERNAL MEDICINE

## 2020-10-08 PROCEDURE — 99213 OFFICE O/P EST LOW 20 MIN: CPT | Mod: PBBFAC,25 | Performed by: INTERNAL MEDICINE

## 2020-10-08 PROCEDURE — 90694 VACC AIIV4 NO PRSRV 0.5ML IM: CPT | Mod: PBBFAC

## 2020-10-08 PROCEDURE — 80061 LIPID PANEL: CPT

## 2020-10-08 PROCEDURE — 83036 HEMOGLOBIN GLYCOSYLATED A1C: CPT

## 2020-10-08 RX ORDER — CHOLECALCIFEROL (VITAMIN D3) 25 MCG
1000 TABLET ORAL DAILY
COMMUNITY
End: 2020-10-08 | Stop reason: SDUPTHER

## 2020-10-08 RX ORDER — CHOLECALCIFEROL (VITAMIN D3) 25 MCG
1000 TABLET ORAL DAILY
Qty: 30 TABLET | Refills: 12 | Status: SHIPPED | OUTPATIENT
Start: 2020-10-08

## 2020-10-08 RX ORDER — IBUPROFEN 100 MG/5ML
1000 SUSPENSION, ORAL (FINAL DOSE FORM) ORAL DAILY
COMMUNITY

## 2020-10-08 RX ORDER — ASPIRIN 81 MG/1
81 TABLET ORAL DAILY
Qty: 30 TABLET | Refills: 12
Start: 2020-10-08 | End: 2022-03-08 | Stop reason: SDUPTHER

## 2020-10-08 NOTE — PROGRESS NOTES
Subjective:       Patient ID: Rakesh Lawrence is a 70 y.o. male.    Chief Complaint: Executive Health    [facility]    Cardiology and DERM 2 x yearly in [place].    Gallstones, no sx.  Reviewed.    He is due for colonoscopy which he will get done in [place].    No syncope, chest pain, pressure, tightness or shortness of breath.  Has been feeling well.    Patient Active Problem List:     Abnormal stress echocardiogram: 2012, acceptable nuclear stress test 2012     Mixed hyperlipidemia     Paroxysmal VT     Cholelithiasis 2007 see ultrasound no sx     Colon polyp: adenoma 2007; 1/11 also 2014 and 2017 to be repeated 2020     Elevated bilirubin since 2002 (1.3)     TIA (transient ischemic attack) 2014 workup in [place] negative          Review of Systems   Constitutional: Negative.    HENT: Negative for congestion, postnasal drip, rhinorrhea and sinus pressure.    Eyes: Negative for redness and visual disturbance.   Respiratory: Negative for apnea, choking, shortness of breath and stridor.    Cardiovascular: Negative for chest pain, palpitations and leg swelling.   Gastrointestinal: Negative for abdominal pain, constipation, diarrhea and nausea.   Genitourinary: Negative for difficulty urinating, flank pain, frequency, testicular pain and urgency.   Musculoskeletal: Negative for arthralgias, back pain and myalgias.   Skin: Negative for color change and rash.   Neurological: Negative.    Psychiatric/Behavioral: Negative.        Objective:      Physical Exam  Constitutional:       Appearance: He is well-developed.   HENT:      Head: Normocephalic and atraumatic.      Right Ear: External ear normal.      Left Ear: External ear normal.   Eyes:      Extraocular Movements: Extraocular movements intact.      Conjunctiva/sclera: Conjunctivae normal.   Neck:      Musculoskeletal: Normal range of motion and neck supple.      Thyroid: No thyromegaly.   Cardiovascular:      Rate and Rhythm: Normal rate and regular rhythm.       Heart sounds: No murmur.   Pulmonary:      Effort: Pulmonary effort is normal. No respiratory distress.      Breath sounds: Normal breath sounds. No wheezing.   Abdominal:      General: There is no distension.      Palpations: Abdomen is soft.      Tenderness: There is no abdominal tenderness.   Musculoskeletal:         General: No tenderness.   Lymphadenopathy:      Cervical: No cervical adenopathy.   Skin:     General: Skin is warm and dry.   Neurological:      Mental Status: He is alert and oriented to person, place, and time.      Cranial Nerves: No cranial nerve deficit.   Psychiatric:         Behavior: Behavior normal.         Assessment:       1. Annual physical exam    2. Polyp of colon, unspecified part of colon, unspecified type    3. Calculus of gallbladder without cholecystitis without obstruction    4. Mixed hyperlipidemia    5. Paroxysmal VT    6. Abnormal stress echocardiogram: 2012, acceptable nuclear stress test 2012        Plan:         Rakesh was seen today for [employer].    Diagnoses and all orders for this visit:    Annual physical exam    Polyp of colon, unspecified part of colon, unspecified type    Calculus of gallbladder without cholecystitis without obstruction    Mixed hyperlipidemia    Paroxysmal VT    Abnormal stress echocardiogram: 2012, acceptable nuclear stress test 2012    Other orders  -     aspirin (ECOTRIN) 81 MG EC tablet; Take 1 tablet (81 mg total) by mouth once daily.  -     vitamin D (VITAMIN D3) 1000 units Tab; Take 1 tablet (1,000 Units total) by mouth once daily.     EKG acceptable   Labs acceptable, continue regimen   Schedule cardiology follow-up- he prefers to do in [place]   Schedule dermatology follow-up- he prefers to do in [place]   Schedule colonoscopy- he prefers to do in [place]    Flu shot today  Shingles vaccine # 1  Tdap also recommended

## 2020-10-08 NOTE — LETTER
October 8, 2020    Rakesh Lawrence  [address] [place] [address]  [address]  [place] [address]             Omar Humphries [facility]  [address]  [facility] [address]  Phone: [phone]  Fax: [phone] Dear Mr. Lawrence:    Thank you for allowing me to serve you and perform your Executive Health exam on 10/8/2020.  This letter will serve a brief summary of the history, physical findings, and laboratory/studies performed and recommendations at that time.    Reason for Visit: Executive Health Preventive Physical Examination    Past Medical History:   Diagnosis Date    Cholelithiasis 2011 12/5/2013    Colon polyp: adenoma 2007; repeated wnl 1/11 in [place] due in 2014 12/5/2013    Elevated bilirubin since 2002 (1.3) 12/5/2013    Paroxysmal VT 1/4/2013    Pneumococcal vaccination declined 11/30/2017    TIA (transient ischemic attack) 2014 workup in [place] negative 11/13/2014       Past Surgical History:   Procedure Laterality Date    EYE SURGERY      TONSILLECTOMY         Family History   Problem Relation Age of Onset    Heart disease Mother     Stroke Mother     Hypertension Mother     Cancer Father         lung     Cancer Sister         ? lung    COPD Sister     No Known Problems Daughter     No Known Problems Daughter     No Known Problems Daughter     Stroke Paternal Uncle     Hypertension Paternal Uncle        Social History     Socioeconomic History    Marital status:      Spouse name: Not on file    Number of children: 3    Years of education: Not on file    Highest education level: Not on file   Occupational History    Not on file   Social Needs    Financial resource strain: Not on file    Food insecurity     Worry: Not on file     Inability: Not on file    Transportation needs     Medical: Not on file     Non-medical: Not on file   Tobacco Use    Smoking status: Never Smoker    Smokeless tobacco: Never Used   Substance and Sexual Activity    Alcohol use: Yes      Comment: occasional    Drug use: No    Sexual activity: Yes     Partners: Female   Lifestyle    Physical activity     Days per week: Not on file     Minutes per session: Not on file    Stress: Not at all   Relationships    Social connections     Talks on phone: Not on file     Gets together: Not on file     Attends [employer] service: Not on file     Active member of club or organization: Not on file     Attends meetings of clubs or organizations: Not on file     Relationship status: Not on file   Other Topics Concern    Not on file   Social History Narrative    Not on file        Review of patient's allergies indicates:  No Known Allergies      Current Outpatient Medications:     ascorbic acid, vitamin C, (VITAMIN C) 1000 MG tablet, Take 1,000 mg by mouth once daily., Disp: , Rfl:     aspirin (ECOTRIN) 81 MG EC tablet, Take 1 tablet (81 mg total) by mouth once daily., Disp: 30 tablet, Rfl: 12    clopidogrel (PLAVIX) 75 mg tablet, Take 75 mg by mouth once daily., Disp: , Rfl:     pravastatin (PRAVACHOL) 80 MG tablet, Take 1 tablet (80 mg total) by mouth once daily., Disp: 90 tablet, Rfl: 3    vitamin D (VITAMIN D3) 1000 units Tab, Take 1 tablet (1,000 Units total) by mouth once daily., Disp: 30 tablet, Rfl: 12    zinc sulfate (ZINC-220 ORAL), Take by mouth., Disp: , Rfl:     flu vac 2020 65up-fqiGN76M,PF, (FLUAD QUAD 2020-21,65Y UP,,PF,) 60 mcg (15 mcg x 4)/0.5 mL Syrg, Inject 0.5 mLs into the muscle once. for 1 dose, Disp: 0.5 mL, Rfl: 0    varicella-zoster [id number], PF, (SHINGRIX, PF,) 50 mcg/0.5 mL injection, Inject 0.5 mLs into the muscle once. for 1 dose, Disp: 1 each, Rfl: 0     Review of Systems  Review of Systems - Negative     Physical Exam:  General: General appearance: alert, well appearing, and in no distress.   Skin: Skin exam - normal coloration and turgor, no rashes, no suspicious skin lesions noted.  HEENT: Ears - bilateral TM's and external ear canals normal. , ENT exam reveals - ENT  exam normal, no neck nodes or sinus tenderness.   Lungs: Chest: clear to auscultation, no wheezes, rales or rhonchi, symmetric air entry.   Heart: CVS exam: normal rate, regular rhythm, normal S1, S2, no murmurs, rubs, clicks or gallops.   Extremities: Exam of extremities: peripheral pulses normal, no pedal edema, no clubbing or cyanosis    Labs:  Results for orders placed or performed in visit on 10/08/20   Comprehensive metabolic panel   Result Value Ref Range    Sodium 141 136 - 145 mmol/L    Potassium 4.2 3.5 - 5.1 mmol/L    Chloride 106 95 - 110 mmol/L    CO2 28 23 - 29 mmol/L    Glucose 95 70 - 110 mg/dL    BUN, Bld 13 8 - 23 mg/dL    Creatinine 1.0 0.5 - 1.4 mg/dL    Calcium 9.3 8.7 - 10.5 mg/dL    Total Protein 7.1 6.0 - 8.4 g/dL    Albumin 3.9 3.5 - 5.2 g/dL    Total Bilirubin 1.2 (H) 0.1 - 1.0 mg/dL    Alkaline Phosphatase 48 (L) 55 - 135 U/L    AST 19 10 - 40 U/L    ALT 19 10 - 44 U/L    Anion Gap 7 (L) 8 - 16 mmol/L    eGFR if African American >60.0 >60 mL/min/1.73 m^2    eGFR if non African American >60.0 >60 mL/min/1.73 m^2   CBC Without Differential   Result Value Ref Range    WBC 5.40 3.90 - 12.70 K/uL    RBC 4.80 4.60 - 6.20 M/uL    Hemoglobin 15.2 14.0 - 18.0 g/dL    Hematocrit 46.8 40.0 - 54.0 %    Mean Corpuscular Volume 98 82 - 98 fL    Mean Corpuscular Hemoglobin 31.7 (H) 27.0 - 31.0 pg    Mean Corpuscular Hemoglobin Conc 32.5 32.0 - 36.0 g/dL    RDW 12.4 11.5 - 14.5 %    Platelets 176 150 - 350 K/uL    MPV 10.4 9.2 - 12.9 fL   Lipid panel   Result Value Ref Range    Cholesterol 131 120 - 199 mg/dL    Triglycerides 66 30 - 150 mg/dL    HDL 50 40 - 75 mg/dL    LDL Cholesterol 67.8 63.0 - 159.0 mg/dL    Hdl/Cholesterol Ratio 38.2 20.0 - 50.0 %    Total Cholesterol/HDL Ratio 2.6 2.0 - 5.0    Non-HDL Cholesterol 81 mg/dL   TSH   Result Value Ref Range    TSH 0.908 0.400 - 4.000 uIU/mL   PSA, Screening (every year)   Result Value Ref Range    PSA, SCREEN 0.46 0.00 - 4.00 ng/mL   Hemoglobin A1c    Result Value Ref Range    Hemoglobin A1C 4.8 4.0 - 5.6 %    Estimated Avg Glucose 91 68 - 131 mg/dL      EKG was acceptable.        Assessment/Recommendations:  Routine Health Maintenance:  You had your flu vaccine today.  I recommend the shingles vaccine which is a series of 2.  I also recommend a tetanus diphtheria pertussis booster which is good for 10 years.    You are due for colonoscopy and you indicated you will schedule this in [place].    Please be sure to schedule your follow-up with your cardiologist and your dermatologist.    At this time, you appear to be in good medical condition.  I look forward to seeing you again next year.  Please work on a plant based eating plan and portion control along with regular vigorous exercise with a goal of losing 10 pounds in the next year Please contact me should you have any questions or concerns regarding physical findings, or my recommendations.      Sincerely,            Naheed Rodriguez MD, FACP

## 2020-10-08 NOTE — PATIENT INSTRUCTIONS
Prevention Guidelines, Men Ages 65 and Older  Screening tests and vaccines are an important part of managing your health. Health counseling is essential, too. Below are guidelines for these, for men ages 65 and older. Talk with your healthcare provider to make sure youre up-to-date on what you need.  Screening Who needs it How often   Abdominal aortic aneurysm Men ages 65 to 75 who have ever smoked 1 ultrasound   Alcohol misuse All men in this age group At routine exams   Blood pressure All men in this age group Every 2 years if your blood pressure is less than 120/80 mm Hg; yearly if your systolic blood pressure is 120 to 139 mm Hg, or your diastolic blood pressure reading is 80 to 89 mm Hg   Colorectal cancer All men in this age group Flexible sigmoidoscopy every 5 years, or colonoscopy every 10 years, or double-contrast barium enema every 5 years; yearly fecal occult blood test or fecal immunochemical test; or a stool DNA test as often as your healthcare provider advises; talk with your healthcare provider about which tests are best for you and when you no longer need colonoscopies (generally after age 75)   Depression All men in this age group At routine exams   Type 2 diabetes or prediabetes All adults beginning at age 45 and adults without symptoms at any age who are overweight or obese and have 1 or more other risk factors for diabetes At least every 3 years (yearly if your blood sugar has already begun to rise)   Hepatitis C Men at increased risk for infection - talk with your healthcare provider At routine exams   High cholesterol or triglycerides All men in this age group At least every 5 years   HIV Men at increased risk for infection - talk with your healthcare provider At routine exams   Lung cancer Adults ages 55 to 80 who have smoked Yearly screening in smokers with 30 pack-year history of smoking or who quit within 15 years   Obesity All men in this age group At routine exams   Prostate cancer All  men in this age group, talk to healthcare provider about risks and benefits of digital rectal exam (GEORGE) and prostate-specific antigen (PSA) screening1 At routine exams   Syphilis Men at increased risk for infection - talk with your healthcare provider At routine exams   Tuberculosis Men at increased risk for infection - talk with your healthcare provider Ask your healthcare provider   Vision All men in this age group Every 1 to 2 years; if you have a chronic health condition, ask your healthcare provider if you needs exams more often   Vaccine Who needs it How often   Chickenpox (varicella) All men in this age group who have no record of this infection or vaccine 2 doses; second dose should be given at least 4 weeks after the first dose   Hepatitis A Men at increased risk for infection - talk with your healthcare provider 2 doses given at least 6 months apart   Hepatitis B Men at increased risk for infection - talk with your healthcare provider 3 doses over 6 months; second dose should be given 1 month after the first dose; the third dose should be given at least 2 months after the second dose and at least 4 months after the first dose   Haemophilus influenzae Type B (HIB) Men at increased risk for infection - talk with your healthcare provider 1 to 3 doses   Influenza (flu) All men in this age group  Once a year   Meningococcal Men at increased risk for infection - talk with your healthcare provider 1 or more doses   Pneumococcal conjugate vaccine (PCV13) and pneumococcal polysaccharide vaccine (PPSV23) All men in this age group 1 dose of each vaccine   Tetanus/diphtheria/  pertussis (Td/Tdap) booster All men in this age group Td every 10 years, or Tdap if you will have contact with a child younger than 12 months old   Zoster All men in this age group 1 dose   Counseling Who needs it How often   Diet and exercise Men who are overweight or obese When diagnosed, and then at routine exams   Fall prevention (exercise,  vitamin D supplements) All men in this age group At routine exams   Sexually transmitted infection Men at increased risk for infection - talk with your healthcare provider At routine exams   Use of daily aspirin Men ages 45 to 79 at risk for cardiovascular health problems At routine exams   Use of tobacco and the health effects it can cause All men in this age group Every visit   [address] [place] [address] [facility]   Date Last Reviewed: 2/1/2017  © 0688-3580 [employer], [employer]. [address] [place] [address], [place] [address]. All rights reserved. This information is not intended as a substitute for professional medical care. Always follow your healthcare professional's instructions.    Tdap soon

## 2020-10-14 ENCOUNTER — TELEPHONE (OUTPATIENT)
Dept: INTERNAL MEDICINE | Facility: CLINIC | Age: 70
End: 2020-10-14

## 2020-10-14 NOTE — TELEPHONE ENCOUNTER
----- Message from Gladis Cisse sent at 10/14/2020 11:35 AM CDT -----  Contact: Rakesh lama [phone]  Type:  Needs Medical Advice    Who Called: E,rogelio self  Symptoms (please be specific):   How long has patient had these symptoms:   Pharmacy name and phone #:   Would the patient rather a call back or a response via [facility]? Call back  Best Call Back Number: [phone]  Additional Information: Pt is requesting a call back from the nurse because he wants a copy of the EKG that Dr Rodriguez has faxed over to [facility] to his cardiologist. Fax# [phone] attn: April

## 2021-01-21 ENCOUNTER — TELEPHONE (OUTPATIENT)
Dept: INTERNAL MEDICINE | Facility: CLINIC | Age: 71
End: 2021-01-21

## 2022-01-27 NOTE — LETTER
November 21, 2019    Rakesh Lawrence  [address] [place] [address]  [address]  [place] [address]             [facility]  [address]  [facility] [address]  Phone: [phone]  Fax: [phone] Dear Mr. Lawrence:    Thank you for allowing me to serve you and perform your Executive Health exam on 11/21/2019.  This letter will serve a brief summary of the history, physical findings, and laboratory/studies performed and recommendations at that time.    Reason for Visit: Executive Health Preventive Physical Examination    Past Medical History:   Diagnosis Date    Cholelithiasis 2011 12/5/2013    Colon polyp: adenoma 2007; repeated wnl 1/11 in [place] due in 2014 12/5/2013    Elevated bilirubin since 2002 (1.3) 12/5/2013    Paroxysmal VT 1/4/2013    Pneumococcal vaccination declined 11/30/2017    TIA (transient ischemic attack) 2014 workup in [place] negative 11/13/2014       Past Surgical History:   Procedure Laterality Date    EYE SURGERY      TONSILLECTOMY         Family History   Problem Relation Age of Onset    Heart disease Mother     Stroke Mother     Hypertension Mother     Cancer Father         lung     Cancer Sister         ? lung    COPD Sister     No Known Problems Daughter     No Known Problems Daughter     No Known Problems Daughter     Stroke Paternal Uncle     Hypertension Paternal Uncle             Review of patient's allergies indicates:  No Known Allergies      Current Outpatient Medications:     acetaminophen/chlorpheniramine (CORICIDIN ORAL), Take by mouth., Disp: , Rfl:     aspirin (ECOTRIN) 81 MG EC tablet, Take 81 mg by mouth once daily.  , Disp: , Rfl:     clopidogrel (PLAVIX) 75 mg tablet, Take 75 mg by mouth once daily., Disp: , Rfl:     pravastatin (PRAVACHOL) 80 MG tablet, Take 1 tablet (80 mg total) by mouth once daily., Disp: 90 tablet, Rfl: 3     Review of Systems  Review of Systems - Negative     Physical Exam:  General: General appearance: alert,  Patient seen for follow-up. Attended NICU rounds, discussed infant's nutritional status/care plan with medical team. Growth parameters, feeding recommendations, nutrient requirements, pertinent labs reviewed. Infant on room air, currently trial feeds without nasal cannula. Infant on chronic diuretics (chlorothiazide) since 1/22. In an open crib. Tolerating current feeds EHM+HMF 24 kcal.oz + Liquid protein 1ml q3 hours in setting of hx of poor growth, borderline low BUN. Current wt 3170 g, up 30g since yesterday, current average daily wt gain 40g/day, acceptable. Infant currently 54% ile for weight. Per Infant driven feeding protocol, infant fed 100% PO, changed to ad lalitha 1/26. RD remains available as needed.    Age: 2m2w  Gestational Age: 26.3 weeks  PMA/Corrected Age: 37.6 weeks     Birth Weight (0.97 kg): (69 %ile)  Z-score: 0.48  Current Weight (kg): 3.17 ( 54%ile)  Z-score: 0.10  Average Daily Weight Gain: 41g/day    Height (cm): 46.5 (01-23)  (54%ile)  Z-score: 0.10  Head Circumference (cm): 34 (01-23), 33.5 (01-16), 32.5 (01-09) (%ile)  Z-score: -1.76    Pertinent Medications:    ferrous sulfate Oral Liquid - Peds  multivitamin Oral Drops - Peds      chlorothiazide  Oral Liquid - Peds      Pertinent Labs:    Calcium 10.9 mg/dL  Phosphorus 6.3 mg/dL  Alkaline Phosphatase --   BUN 18 mg/dL (1-24)      Feeding Plan:  [ x ] Oral           [  ] Enteral          [  ] Parenteral       [  ] IV Fluids    Ad lalitha: 60 ml EHM + HMF every 3 hrs + Liquid protein 1ml every 3 hours = 154 ml/kg/d total volume, 138 umer/kg/d, 4.2gm prot/kg/d.       Infant Driven Feeding:  [  ] N/A           [  ] Assessment          [ x ] Protocol     = 100 % PO X 24 hours                 Void X 24hrs: 335 ml WDL/Stool X 24 hours: 8 WDL     Respiratory Therapy:  none    Nutrition Diagnosis of increased nutrient needs remains appropriate.    Plan/Recommendations:    Monitoring and Evaluation:  [  ] % Birth Weight  [ x ] Average daily weight gain  [ x ] Growth velocity (weight/length/HC)  [ x ] Feeding tolerance  [  ] Electrolytes (daily until stable & TPN well-tolerated; then weekly), triglycerides (daily until tolerating goal 3mg/kg/d lipid; then weekly), liver function tests (weekly), dextrose sticks (daily)  [ x ] BUN, Calcium, Phosphorus, Alkaline Phosphatase, Ferritin (once tolerating full feeds for ~1 week; then every 1-2 weeks)  [ x ] Electrolytes while on chronic diuretics (weekly/prn).   [  ] Other: well appearing, and in no distress.   Skin: Skin exam - normal coloration and turgor, no rashes, no suspicious skin lesions noted. Sun exposed skin hands, face  HEENT: Ears - bilateral TM's and external ear canals normal. , ENT exam reveals - ENT exam normal, no neck nodes or sinus tenderness.   Lungs: Chest: clear to auscultation, no wheezes, rales or rhonchi, symmetric air entry.   Heart: CVS exam: normal rate, regular rhythm, normal S1, S2, no murmurs, rubs, clicks or gallops.   Extremities: Exam of extremities: peripheral pulses normal, no pedal edema, no clubbing or cyanosis    Labs:  Results for orders placed or performed in visit on 11/21/19   Comprehensive metabolic panel   Result Value Ref Range    Sodium 139 136 - 145 mmol/L    Potassium 4.3 3.5 - 5.1 mmol/L    Chloride 106 95 - 110 mmol/L    CO2 28 23 - 29 mmol/L    Glucose 97 70 - 110 mg/dL    BUN, Bld 15 8 - 23 mg/dL    Creatinine 1.0 0.5 - 1.4 mg/dL    Calcium 9.4 8.7 - 10.5 mg/dL    Total Protein 7.0 6.0 - 8.4 g/dL    Albumin 3.7 3.5 - 5.2 g/dL    Total Bilirubin 1.7 (H) 0.1 - 1.0 mg/dL    Alkaline Phosphatase 55 55 - 135 U/L    AST 23 10 - 40 U/L    ALT 23 10 - 44 U/L    Anion Gap 5 (L) 8 - 16 mmol/L    eGFR if African American >60.0 >60 mL/min/1.73 m^2    eGFR if non African American >60.0 >60 mL/min/1.73 m^2   CBC Without Differential   Result Value Ref Range    WBC 5.19 3.90 - 12.70 K/uL    RBC 4.68 4.60 - 6.20 M/uL    Hemoglobin 15.1 14.0 - 18.0 g/dL    Hematocrit 44.7 40.0 - 54.0 %    Mean Corpuscular Volume 96 82 - 98 fL    Mean Corpuscular Hemoglobin 32.3 (H) 27.0 - 31.0 pg    Mean Corpuscular Hemoglobin Conc 33.8 32.0 - 36.0 g/dL    RDW 11.9 11.5 - 14.5 %    Platelets 178 150 - 350 K/uL    MPV 10.0 9.2 - 12.9 fL   Lipid panel   Result Value Ref Range    Cholesterol 132 120 - 199 mg/dL    Triglycerides 94 30 - 150 mg/dL    HDL 41 40 - 75 mg/dL    LDL Cholesterol 72.2 63.0 - 159.0 mg/dL    Hdl/Cholesterol Ratio 31.1 20.0 - 50.0 %    Total  Cholesterol/HDL Ratio 3.2 2.0 - 5.0    Non-HDL Cholesterol 91 mg/dL   TSH   Result Value Ref Range    TSH 0.901 0.400 - 4.000 uIU/mL   PSA, Screening (every year)   Result Value Ref Range    PSA, SCREEN 0.64 0.00 - 4.00 ng/mL   Hemoglobin A1c   Result Value Ref Range    Hemoglobin A1C 5.1 4.0 - 5.6 %    Estimated Avg Glucose 100 68 - 131 mg/dL      EKG was acceptable.    Assessment/Recommendations:  Routine Health Maintenance; you had a flu shot and the second pneumonia vaccine today.  I recommend at a later point getting the tetanus booster and the shingles vaccine series.    At this time, you appear to be in good medical condition.  Please work on a plant based eating plan and regular vigorous exercise with a goal of losing 10-15 lb in the next year.  Please be sure to keep your Cardiology and Dermatology follow-up appointments.  I would also recommend that you schedule your colonoscopy in early 2020.      I look forward to seeing you again next year.  Please contact me should you have any questions or concerns regarding physical findings, or my recommendations.        Sincerely,          Naheed Rodriguez MD, FACP      Patient seen for follow-up. Attended NICU rounds, discussed infant's nutritional status/care plan with medical team. Growth parameters, feeding recommendations, nutrient requirements, pertinent labs reviewed. Infant on room air, currently trial feeds without nasal cannula. Infant on chronic diuretics (chlorothiazide) since 1/22. In an open crib. Tolerating current feeds EHM+HMF 24 kcal.oz + Liquid protein 1ml q3 hours in setting of hx of poor growth, borderline low BUN. Current wt 3170 g, up 30g since yesterday, current average daily wt gain 40g/day, acceptable. Infant currently 54% ile for weight. Per Infant driven feeding protocol, infant fed 100% PO, changed to ad lalitha 1/26. RD remains available as needed.    Age: 2m2w  Gestational Age: 26.3 weeks  PMA/Corrected Age: 37.6 weeks     Birth Weight (0.97 kg): (69 %ile)  Z-score: 0.48  Current Weight (kg): 3.17 ( 54%ile)  Z-score: 0.10  Average Daily Weight Gain: 41g/day    Height (cm): 46.5 (01-23)  (54%ile)  Z-score: 0.10  Head Circumference (cm): 34 (01-23), 33.5 (01-16), 32.5 (01-09) (%ile)  Z-score: -1.76    Pertinent Medications:    ferrous sulfate Oral Liquid - Peds  multivitamin Oral Drops - Peds      chlorothiazide  Oral Liquid - Peds      Pertinent Labs:    Calcium 10.9 mg/dL  Phosphorus 6.3 mg/dL  Alkaline Phosphatase --   BUN 18 mg/dL (1-24)      Feeding Plan:  [ x ] Oral           [  ] Enteral          [  ] Parenteral       [  ] IV Fluids    Ad lalitha: 60 ml EHM + HMF every 3 hrs + Liquid protein 1ml every 3 hours = 154 ml/kg/d total volume, 138 umer/kg/d, 4.2gm prot/kg/d.       Infant Driven Feeding:  [  ] N/A           [  ] Assessment          [ x ] Protocol     = 100 % PO X 24 hours                 Void X 24hrs: 335 ml WDL/Stool X 24 hours: 8 WDL     Respiratory Therapy:  none    Nutrition Diagnosis of increased nutrient needs remains appropriate.    Plan/Recommendations:  1. Continue to adjust feeds as needed to maintain goal intake 120-130 kcal/kg/d. >150 ml/kg/day total volume.   2. Continue supplementation poly vi sol 1 ml daily, ferrous sulfate 2mg/kg/day.  3. Continue to encourage nippling.     Monitoring and Evaluation:  [  ] % Birth Weight  [ x ] Average daily weight gain  [ x ] Growth velocity (weight/length/HC)  [ x ] Feeding tolerance  [  ] Electrolytes (daily until stable & TPN well-tolerated; then weekly), triglycerides (daily until tolerating goal 3mg/kg/d lipid; then weekly), liver function tests (weekly), dextrose sticks (daily)  [ x ] BUN, Calcium, Phosphorus, Alkaline Phosphatase, Ferritin (once tolerating full feeds for ~1 week; then every 1-2 weeks)  [ x ] Electrolytes while on chronic diuretics (weekly/prn).   [  ] Other: Patient seen for follow-up. Attended NICU rounds, discussed infant's nutritional status/care plan with medical team. Growth parameters, feeding recommendations, nutrient requirements, pertinent labs reviewed. Infant on room air, currently trial feeds without nasal cannula. Infant on chronic diuretics (chlorothiazide) since 1/22. In an open crib. NG Tube d/cd 1/26, infant tolerating current feeds EHM+HMF 24 kcal/oz @ 60 ml/hr q 3 hours+ Liquid protein 1ml q3 hours in setting of hx of poor growth, borderline low BUN. Current wt 3170 g, up 30g since yesterday, current average daily wt gain 40g/day, acceptable. Infant currently 54% ile for weight. Per Infant driven feeding protocol, infant fed 100% PO, changed to ad lalitha 1/26. RD remains available as needed.    Age: 2m2w  Gestational Age: 26.3 weeks  PMA/Corrected Age: 37.6 weeks     Birth Weight (0.97 kg): (69 %ile)  Z-score: 0.48  Current Weight (kg): 3.17 ( 54%ile)  Z-score: 0.10  Average Daily Weight Gain: 41g/day    Height (cm): 46.5 (01-23)  (54%ile)  Z-score: 0.10  Head Circumference (cm): 34 (01-23), 33.5 (01-16), 32.5 (01-09) (%ile)  Z-score: -1.76    Pertinent Medications:    ferrous sulfate Oral Liquid - Peds  multivitamin Oral Drops - Peds      chlorothiazide  Oral Liquid - Peds      Pertinent Labs:    Calcium 10.9 mg/dL  Phosphorus 6.3 mg/dL  Alkaline Phosphatase --   BUN 18 mg/dL (1-24)      Feeding Plan:  [ x ] Oral           [  ] Enteral          [  ] Parenteral       [  ] IV Fluids    Ad lalitha: 60 ml EHM + HMF every 3 hrs + Liquid protein 1ml every 3 hours = 154 ml/kg/d total volume, 138 umer/kg/d, 4.2gm prot/kg/d.       Infant Driven Feeding:  [  ] N/A           [  ] Assessment          [ x ] Protocol     = 100 % PO X 24 hours                 Void X 24hrs: 335 ml WDL/Stool X 24 hours: 8 WDL     Respiratory Therapy:  none    Nutrition Diagnosis of increased nutrient needs remains appropriate.    Plan/Recommendations:  1. Continue to adjust feeds as needed to maintain goal intake 120-130 kcal/kg/d. >150 ml/kg/day total volume.   2. Continue supplementation poly vi sol 1 ml daily, ferrous sulfate 2mg/kg/day.  3. Continue to encourage nippling.     Monitoring and Evaluation:  [  ] % Birth Weight  [ x ] Average daily weight gain  [ x ] Growth velocity (weight/length/HC)  [ x ] Feeding tolerance  [  ] Electrolytes (daily until stable & TPN well-tolerated; then weekly), triglycerides (daily until tolerating goal 3mg/kg/d lipid; then weekly), liver function tests (weekly), dextrose sticks (daily)  [ x ] BUN, Calcium, Phosphorus, Alkaline Phosphatase, Ferritin (once tolerating full feeds for ~1 week; then every 1-2 weeks)  [ x ] Electrolytes while on chronic diuretics (weekly/prn).   [  ] Other: Patient seen for follow-up. Attended NICU rounds, discussed infant's nutritional status/care plan with medical team. Growth parameters, feeding recommendations, nutrient requirements, pertinent labs reviewed. Infant on room air, currently trial feeds without nasal cannula. Infant on chronic diuretics (chlorothiazide) since 1/22. In an open crib. NG Tube d/cd 1/26, changed to ad lalitha, infant tolerating current feeds EHM+HMF 24 kcal/oz + Liquid protein 1ml q3 hours in setting of hx of poor growth, borderline low BUN. Current wt 3170 g, up 30g since yesterday. Per rounds, plan to check nutrition labs 1/28. RD remains available as needed.    Age: 2m2w  Gestational Age: 26.3 weeks  PMA/Corrected Age: 37.6 weeks     Birth Weight (0.97 kg): (69 %ile)  Z-score: 0.48  Current Weight (kg): 3.17    Pertinent Medications:    ferrous sulfate Oral Liquid - Peds  multivitamin Oral Drops - Peds      chlorothiazide  Oral Liquid - Peds      Pertinent Labs:    none pertinent.      Feeding Plan:  [ x ] Oral           [  ] Enteral          [  ] Parenteral       [  ] IV Fluids    Ad lalitha: 24 kcals/oz EHM + HMF every 3 hrs + Liquid protein 1ml every 3 hours = 151 ml/kg/d total volume, 121 umer/kg/d, 4.2gm prot/kg/d.       Infant Driven Feeding:  [  ] N/A           [  ] Assessment          [ x ] Protocol     = 100 % PO X 24 hours                 Void X 24hrs: 4.45ml/kg/hr WDL/Stool X 24 hours: 8 WDL     Respiratory Therapy:  none    Nutrition Diagnosis of increased nutrient needs remains appropriate.    Plan/Recommendations:  1. Continue to encourage feeds of EHM + HMF 24kcals/oz via cue based approach to maintain goal intake 120-130 kcal/kg/d.  2. Continue supplementation poly vi sol 1 ml daily, ferrous sulfate 2mg/kg/day.  3. Continue Liquid Protein 1 ml every 3 hours.    Monitoring and Evaluation:  [ x ] Average daily weight gain  [ x ] Growth velocity (weight/length/HC)  [ x ] Feeding tolerance  [  ] Electrolytes (daily until stable & TPN well-tolerated; then weekly), triglycerides (daily until tolerating goal 3mg/kg/d lipid; then weekly), liver function tests (weekly), dextrose sticks (daily)  [ x ] BUN, Calcium, Phosphorus, Alkaline Phosphatase, Ferritin (once tolerating full feeds for ~1 week; then every 1-2 weeks)  [ x ] Electrolytes while on chronic diuretics (weekly/prn).

## 2022-02-16 DIAGNOSIS — R06.00 DYSPNEA, UNSPECIFIED TYPE: Primary | ICD-10-CM

## 2022-03-08 ENCOUNTER — OFFICE VISIT (OUTPATIENT)
Dept: INTERNAL MEDICINE | Facility: CLINIC | Age: 72
End: 2022-03-08
Payer: MEDICARE

## 2022-03-08 ENCOUNTER — CLINICAL SUPPORT (OUTPATIENT)
Dept: INTERNAL MEDICINE | Facility: CLINIC | Age: 72
End: 2022-03-08
Payer: MEDICARE

## 2022-03-08 ENCOUNTER — PATIENT OUTREACH (OUTPATIENT)
Dept: ADMINISTRATIVE | Facility: HOSPITAL | Age: 72
End: 2022-03-08
Payer: MEDICARE

## 2022-03-08 ENCOUNTER — HOSPITAL ENCOUNTER (OUTPATIENT)
Dept: CARDIOLOGY | Facility: CLINIC | Age: 72
Discharge: HOME | End: 2022-03-08
Payer: MEDICARE

## 2022-03-08 VITALS
SYSTOLIC BLOOD PRESSURE: 120 MMHG | WEIGHT: 218.25 LBS | HEIGHT: 73 IN | DIASTOLIC BLOOD PRESSURE: 65 MMHG | BODY MASS INDEX: 28.93 KG/M2

## 2022-03-08 DIAGNOSIS — R53.0 NEOPLASTIC MALIGNANT RELATED FATIGUE: ICD-10-CM

## 2022-03-08 DIAGNOSIS — I47.29 PAROXYSMAL VT: ICD-10-CM

## 2022-03-08 DIAGNOSIS — Z00.00 ROUTINE GENERAL MEDICAL EXAMINATION AT A HEALTH CARE FACILITY: Primary | ICD-10-CM

## 2022-03-08 DIAGNOSIS — C44.90 SKIN CANCER: ICD-10-CM

## 2022-03-08 DIAGNOSIS — R73.09 IMPAIRED GLUCOSE TOLERANCE TEST: ICD-10-CM

## 2022-03-08 DIAGNOSIS — Z12.5 SPECIAL SCREENING FOR MALIGNANT NEOPLASM OF PROSTATE: ICD-10-CM

## 2022-03-08 DIAGNOSIS — R53.83 FATIGUE, UNSPECIFIED TYPE: ICD-10-CM

## 2022-03-08 DIAGNOSIS — R06.00 DYSPNEA, UNSPECIFIED TYPE: ICD-10-CM

## 2022-03-08 DIAGNOSIS — Z00.00 ANNUAL PHYSICAL EXAM: Primary | ICD-10-CM

## 2022-03-08 DIAGNOSIS — E78.5 HYPERLIPIDEMIA, UNSPECIFIED HYPERLIPIDEMIA TYPE: ICD-10-CM

## 2022-03-08 DIAGNOSIS — E78.2 MIXED HYPERLIPIDEMIA: ICD-10-CM

## 2022-03-08 DIAGNOSIS — K63.5 POLYP OF COLON, UNSPECIFIED PART OF COLON, UNSPECIFIED TYPE: ICD-10-CM

## 2022-03-08 DIAGNOSIS — R79.9 ABNORMAL BLOOD CHEMISTRY: ICD-10-CM

## 2022-03-08 LAB
ALBUMIN SERPL BCP-MCNC: 3.7 G/DL (ref 3.5–5.2)
ALP SERPL-CCNC: 51 U/L (ref 55–135)
ALT SERPL W/O P-5'-P-CCNC: 22 U/L (ref 10–44)
ANION GAP SERPL CALC-SCNC: 11 MMOL/L (ref 8–16)
AST SERPL-CCNC: 24 U/L (ref 10–40)
BILIRUB SERPL-MCNC: 1 MG/DL (ref 0.1–1)
BUN SERPL-MCNC: 16 MG/DL (ref 8–23)
CALCIUM SERPL-MCNC: 9.4 MG/DL (ref 8.7–10.5)
CHLORIDE SERPL-SCNC: 105 MMOL/L (ref 95–110)
CHOLEST SERPL-MCNC: 136 MG/DL (ref 120–199)
CHOLEST/HDLC SERPL: 2.7 {RATIO} (ref 2–5)
CO2 SERPL-SCNC: 27 MMOL/L (ref 23–29)
COMPLEXED PSA SERPL-MCNC: 0.59 NG/ML (ref 0–4)
CREAT SERPL-MCNC: 0.8 MG/DL (ref 0.5–1.4)
ERYTHROCYTE [DISTWIDTH] IN BLOOD BY AUTOMATED COUNT: 12.8 % (ref 11.5–14.5)
EST. GFR  (AFRICAN AMERICAN): >60 ML/MIN/1.73 M^2
EST. GFR  (NON AFRICAN AMERICAN): >60 ML/MIN/1.73 M^2
ESTIMATED AVG GLUCOSE: 103 MG/DL (ref 68–131)
GLUCOSE SERPL-MCNC: 97 MG/DL (ref 70–110)
HBA1C MFR BLD: 5.2 % (ref 4–5.6)
HCT VFR BLD AUTO: 45.4 % (ref 40–54)
HDLC SERPL-MCNC: 51 MG/DL (ref 40–75)
HDLC SERPL: 37.5 % (ref 20–50)
HGB BLD-MCNC: 14.9 G/DL (ref 14–18)
LDLC SERPL CALC-MCNC: 74.6 MG/DL (ref 63–159)
MCH RBC QN AUTO: 31.4 PG (ref 27–31)
MCHC RBC AUTO-ENTMCNC: 32.8 G/DL (ref 32–36)
MCV RBC AUTO: 96 FL (ref 82–98)
NONHDLC SERPL-MCNC: 85 MG/DL
PLATELET # BLD AUTO: 182 K/UL (ref 150–450)
PMV BLD AUTO: 10.1 FL (ref 9.2–12.9)
POTASSIUM SERPL-SCNC: 4.2 MMOL/L (ref 3.5–5.1)
PROT SERPL-MCNC: 6.9 G/DL (ref 6–8.4)
RBC # BLD AUTO: 4.74 M/UL (ref 4.6–6.2)
SODIUM SERPL-SCNC: 143 MMOL/L (ref 136–145)
TRIGL SERPL-MCNC: 52 MG/DL (ref 30–150)
TSH SERPL DL<=0.005 MIU/L-ACNC: 1.57 UIU/ML (ref 0.4–4)
WBC # BLD AUTO: 6.28 K/UL (ref 3.9–12.7)

## 2022-03-08 PROCEDURE — 99397 PR PREVENTIVE VISIT,EST,65 & OVER: ICD-10-PCS | Mod: S$PBB,,, | Performed by: INTERNAL MEDICINE

## 2022-03-08 PROCEDURE — 80061 LIPID PANEL: CPT | Performed by: INTERNAL MEDICINE

## 2022-03-08 PROCEDURE — 99999 PR PBB SHADOW E&M-EST. PATIENT-LVL III: CPT | Mod: PBBFAC,,, | Performed by: INTERNAL MEDICINE

## 2022-03-08 PROCEDURE — 80053 COMPREHEN METABOLIC PANEL: CPT | Performed by: INTERNAL MEDICINE

## 2022-03-08 PROCEDURE — 93005 ELECTROCARDIOGRAM TRACING: CPT | Mod: PBBFAC | Performed by: INTERNAL MEDICINE

## 2022-03-08 PROCEDURE — 85027 COMPLETE CBC AUTOMATED: CPT | Performed by: INTERNAL MEDICINE

## 2022-03-08 PROCEDURE — 99213 OFFICE O/P EST LOW 20 MIN: CPT | Mod: PBBFAC | Performed by: INTERNAL MEDICINE

## 2022-03-08 PROCEDURE — 83036 HEMOGLOBIN GLYCOSYLATED A1C: CPT | Performed by: INTERNAL MEDICINE

## 2022-03-08 PROCEDURE — 99397 PER PM REEVAL EST PAT 65+ YR: CPT | Mod: S$PBB,,, | Performed by: INTERNAL MEDICINE

## 2022-03-08 PROCEDURE — 99999 PR PBB SHADOW E&M-EST. PATIENT-LVL III: ICD-10-PCS | Mod: PBBFAC,,, | Performed by: INTERNAL MEDICINE

## 2022-03-08 PROCEDURE — 84153 ASSAY OF PSA TOTAL: CPT | Performed by: INTERNAL MEDICINE

## 2022-03-08 PROCEDURE — 93010 ELECTROCARDIOGRAM REPORT: CPT | Mod: S$PBB,,, | Performed by: INTERNAL MEDICINE

## 2022-03-08 PROCEDURE — 93010 EKG 12-LEAD: ICD-10-PCS | Mod: S$PBB,,, | Performed by: INTERNAL MEDICINE

## 2022-03-08 PROCEDURE — 84443 ASSAY THYROID STIM HORMONE: CPT | Performed by: INTERNAL MEDICINE

## 2022-03-08 RX ORDER — AMOXICILLIN 500 MG
CAPSULE ORAL DAILY
COMMUNITY

## 2022-03-08 RX ORDER — ASPIRIN 81 MG/1
81 TABLET ORAL DAILY
Qty: 30 TABLET | Refills: 12
Start: 2022-03-08

## 2022-03-08 RX ORDER — PRAVASTATIN SODIUM 80 MG/1
80 TABLET ORAL DAILY
Qty: 90 TABLET | Refills: 3 | Status: SHIPPED | OUTPATIENT
Start: 2022-03-08 | End: 2023-03-17

## 2022-03-08 NOTE — LETTER
AUTHORIZATION FOR RELEASE OF   CONFIDENTIAL INFORMATION    Dear [facility],    We are seeing Rakesh Lawrence, date of birth 1950, in the clinic at [facility]. Naheed Rodriguez MD is the patient's PCP. Rakesh Lawrence has an outstanding lab/procedure at the time we reviewed his chart. In order to help keep his health information updated, he has authorized us to request the following medical record(s):        (  )  MAMMOGRAM                                      ( X )  COLONOSCOPY      (  )  PAP SMEAR                                          (  )  OUTSIDE LAB RESULTS     (  )  DEXA SCAN                                          (  )  EYE EXAM            (  )  FOOT EXAM                                          (  )  ENTIRE RECORD     (  )  OUTSIDE IMMUNIZATIONS                 (  )  _______________         Please fax records to [facility], Leslie A. Blake, MD, [phone]     If you have any questions, please contact LIZA Vivas at [phone].           Patient Name: Rakesh Lawrence  : 1950  Patient Phone #: [phone]

## 2022-03-08 NOTE — PROGRESS NOTES
Subjective:       Patient ID: Rakesh Lawrence is a 72 y.o. male.    Chief Complaint: Executive Health     PE    Recall that he follows closely with Cardiology in Dermatology twice yearly in [facility].   Apparently had some sort of skin cancer removed from his nose few years ago, has been shin follows twice yearly with a local dermatologist.  Carotids looked at in [place].    Had colonoscopy in 2020 in [place]; was told follow up in 5 years.    Labs and EKG today acceptable.    Immunizations reviewed.  He has had all COVID vaccines and flu shot.    Patient Active Problem List:     Abnormal stress echocardiogram: 2012, acceptable nuclear stress test 2012     Mixed hyperlipidemia     Paroxysmal VT     Cholelithiasis 2007 see ultrasound no sx     Colon polyp: adenoma 2007; 1/11 also 2014 and 2017 repeat 2020     Elevated bilirubin since 2002 (1.3)     TIA (transient ischemic attack) 2014 workup in [place] negative      Review of Systems   Constitutional: Negative.    HENT: Negative for congestion, postnasal drip, rhinorrhea and sinus pressure.    Eyes: Negative for redness and visual disturbance.   Respiratory: Negative for apnea, choking, shortness of breath and stridor.    Cardiovascular: Negative for chest pain, palpitations and leg swelling.   Gastrointestinal: Negative for abdominal pain, constipation, diarrhea and nausea.   Genitourinary: Negative for difficulty urinating, flank pain, frequency, testicular pain and urgency.   Musculoskeletal: Negative for arthralgias, back pain and myalgias.   Skin: Negative for color change and rash.   Neurological: Negative.    Psychiatric/Behavioral: Negative.        Objective:      Physical Exam  Constitutional:       Appearance: He is well-developed.   HENT:      Head: Normocephalic and atraumatic.      Right Ear: External ear normal.      Left Ear: External ear normal.   Eyes:      Extraocular Movements: Extraocular movements intact.      Conjunctiva/sclera: Conjunctivae  normal.   Neck:      Thyroid: No thyromegaly.   Cardiovascular:      Rate and Rhythm: Normal rate and regular rhythm.      Heart sounds: No murmur heard.  Pulmonary:      Effort: Pulmonary effort is normal. No respiratory distress.      Breath sounds: Normal breath sounds. No wheezing.   Abdominal:      General: There is no distension.      Palpations: Abdomen is soft.      Tenderness: There is no abdominal tenderness.   Musculoskeletal:         General: No tenderness.      Cervical back: Normal range of motion and neck supple.      Right lower leg: No edema.      Left lower leg: No edema.   Lymphadenopathy:      Cervical: No cervical adenopathy.   Skin:     General: Skin is warm and dry.   Neurological:      General: No focal deficit present.      Mental Status: He is alert and oriented to person, place, and time.      Cranial Nerves: No cranial nerve deficit.   Psychiatric:         Mood and Affect: Mood normal.         Behavior: Behavior normal.         Thought Content: Thought content normal.         Judgment: Judgment normal.         Assessment:       1. Annual physical exam    2. Mixed hyperlipidemia    3. Paroxysmal VT    4. Polyp of colon, unspecified part of colon, unspecified type    5. Skin cancer: on face- follows in [place]        Plan:           Rakesh was seen today for [facility] health.    Diagnoses and all orders for this visit:    Annual physical exam  -     pravastatin (PRAVACHOL) 80 MG tablet; Take 1 tablet (80 mg total) by mouth once daily.    Mixed hyperlipidemia    Paroxysmal VT    Polyp of colon, unspecified part of colon, unspecified type    Skin cancer: on face- follows in [place]    Other orders  -     aspirin (ECOTRIN) 81 MG EC tablet; Take 1 tablet (81 mg total) by mouth once daily.    Shingles vaccine # 2  Tdap today  Labs and EKG acceptable today  He has had all COVID vaccines  Keep Cardiology and Dermatology follow-ups

## 2022-03-08 NOTE — PROGRESS NOTES
Health Maintenance Due   Topic Date Due    COVID-19 Vaccine (1) Never done    Colorectal Cancer Screening  02/15/2020     Triggered LINKS.  Updated Care Everywhere.  Records request has been sent to [facility] asking for a copy of pt's most recent colonoscopy results.  Chart review completed.

## 2022-03-08 NOTE — LETTER
March 8, 2022    Rakesh Lawrence  [address] [place] [address]             Omar Humphries [facility]  [address]  [facility] [address]  Phone: [phone]  Fax: [phone] Dear Mr. Lawrence:    Thank you for allowing me to serve you and perform your Executive Health exam on 3/8/2022.  This letter will serve a brief summary of the history, physical findings, and laboratory/studies performed and recommendations at that time.    Reason for Visit: Executive Health Preventive Physical Examination    Past Medical History:   Diagnosis Date    Cholelithiasis 2011 12/5/2013    Colon polyp: adenoma 2007; repeated wnl 1/11 in [place] due in 2014 12/5/2013    Elevated bilirubin since 2002 (1.3) 12/5/2013    Paroxysmal VT 1/4/2013    Pneumococcal vaccination declined 11/30/2017    TIA (transient ischemic attack) 2014 workup in [place] negative 11/13/2014       Past Surgical History:   Procedure Laterality Date    EYE SURGERY      TONSILLECTOMY         Family History   Problem Relation Age of Onset    Heart disease Mother     Stroke Mother     Hypertension Mother     Cancer Father         lung     Cancer Sister         ? lung    COPD Sister     No Known Problems Daughter     No Known Problems Daughter     No Known Problems Daughter     Stroke Paternal Uncle     Hypertension Paternal Uncle        Social History     Socioeconomic History    Marital status:     Number of children: 3   Tobacco Use    Smoking status: Never Smoker    Smokeless tobacco: Never Used   Substance and Sexual Activity    Alcohol use: Yes     Comment: occasional    Drug use: No    Sexual activity: Yes     Partners: Female        Review of patient's allergies indicates:  No Known Allergies      Current Outpatient Medications:     ascorbic acid, vitamin C, (VITAMIN C) 1000 MG tablet, Take 1,000 mg by mouth once daily., Disp: , Rfl:     clopidogrel (PLAVIX) 75 mg tablet, Take 75 mg by mouth once daily., Disp: ,  Rfl:     omega-3 fatty acids/fish oil (FISH OIL-OMEGA-3 FATTY ACIDS) 300-1,000 mg capsule, Take by mouth once daily., Disp: , Rfl:     vitamin D (VITAMIN D3) 1000 units Tab, Take 1 tablet (1,000 Units total) by mouth once daily., Disp: 30 tablet, Rfl: 12    zinc sulfate (ZINC-220 ORAL), Take by mouth., Disp: , Rfl:     aspirin (ECOTRIN) 81 MG EC tablet, Take 1 tablet (81 mg total) by mouth once daily., Disp: 30 tablet, Rfl: 12    pravastatin (PRAVACHOL) 80 MG tablet, Take 1 tablet (80 mg total) by mouth once daily., Disp: 90 tablet, Rfl: 3     Review of Systems  Review of Systems - Negative except for occasional night time urination and light sleeping    Physical Exam:  General: General appearance: alert, well appearing, and in no distress.   Skin: Skin exam - normal coloration and turgor, no rashes, no suspicious skin lesions noted.  HEENT: Ears - bilateral TM's and external ear canals normal. , ENT exam reveals - ENT exam normal, no neck nodes or sinus tenderness.   Lungs: Chest: clear to auscultation, no wheezes, rales or rhonchi, symmetric air entry.   Heart: CVS exam: normal rate, regular rhythm, normal S1, S2, no murmurs, rubs, clicks or gallops.   Extremities: Exam of extremities: peripheral pulses normal, no pedal edema, no clubbing or cyanosis    Labs:  Results for orders placed or performed in visit on 03/08/22   Comprehensive metabolic panel   Result Value Ref Range    Sodium 143 136 - 145 mmol/L    Potassium 4.2 3.5 - 5.1 mmol/L    Chloride 105 95 - 110 mmol/L    CO2 27 23 - 29 mmol/L    Glucose 97 70 - 110 mg/dL    BUN 16 8 - 23 mg/dL    Creatinine 0.8 0.5 - 1.4 mg/dL    Calcium 9.4 8.7 - 10.5 mg/dL    Total Protein 6.9 6.0 - 8.4 g/dL    Albumin 3.7 3.5 - 5.2 g/dL    Total Bilirubin 1.0 0.1 - 1.0 mg/dL    Alkaline Phosphatase 51 (L) 55 - 135 U/L    AST 24 10 - 40 U/L    ALT 22 10 - 44 U/L    Anion Gap 11 8 - 16 mmol/L    eGFR if African American >60.0 >60 mL/min/1.73 m^2    eGFR if non African  American >60.0 >60 mL/min/1.73 m^2   CBC Without Differential   Result Value Ref Range    WBC 6.28 3.90 - 12.70 K/uL    RBC 4.74 4.60 - 6.20 M/uL    Hemoglobin 14.9 14.0 - 18.0 g/dL    Hematocrit 45.4 40.0 - 54.0 %    MCV 96 82 - 98 fL    MCH 31.4 (H) 27.0 - 31.0 pg    MCHC 32.8 32.0 - 36.0 g/dL    RDW 12.8 11.5 - 14.5 %    Platelets 182 150 - 450 K/uL    MPV 10.1 9.2 - 12.9 fL   Lipid panel   Result Value Ref Range    Cholesterol 136 120 - 199 mg/dL    Triglycerides 52 30 - 150 mg/dL    HDL 51 40 - 75 mg/dL    LDL Cholesterol 74.6 63.0 - 159.0 mg/dL    HDL/Cholesterol Ratio 37.5 20.0 - 50.0 %    Total Cholesterol/HDL Ratio 2.7 2.0 - 5.0    Non-HDL Cholesterol 85 mg/dL   TSH   Result Value Ref Range    TSH 1.567 0.400 - 4.000 uIU/mL   PSA, Screening (every year)   Result Value Ref Range    PSA, Screen 0.59 0.00 - 4.00 ng/mL   Hemoglobin A1c   Result Value Ref Range    Hemoglobin A1C 5.2 4.0 - 5.6 %    Estimated Avg Glucose 103 68 - 131 mg/dL      EKG was acceptable.    Assessment/Recommendations:  Routine Health Maintenance:  Is up-to-date.  I recommend the tetanus booster and the 2nd shingles vaccine.  Had her colonoscopy done in [place] the last couple of years.    Please be sure to keep up with your Cardiology and Dermatology follow-ups.    I recommend you try to reduce your tea and caffeine consumption as well as your alcohol consumption as this may help with the nighttime urination and sleep issues.  If the symptoms continue be problematic, we could consider medication for an enlarged prostate.    At this time, you appear to be in good medical condition.  Please continue to work on exercise and a plant based, portion controlled eating plan.  I look forward to seeing you again next year.  Please contact me should you have any questions or concerns regarding physical findings, or my recommendations.        Sincerely,          Naheed Rodriguez MD, FACP

## 2022-03-15 ENCOUNTER — PATIENT OUTREACH (OUTPATIENT)
Dept: ADMINISTRATIVE | Facility: HOSPITAL | Age: 72
End: 2022-03-15
Payer: MEDICARE

## 2022-03-15 NOTE — PROGRESS NOTES
AMD (Dry) Counseling:  I have instructed the patient to take an AREDS 2 vitamin mixture to minimize the risk of disease progression. The importance of daily monitoring with Amsler grid was emphasized and an Amsler grid was provided if the patient did not have one. The patient was advised to call the office if new symptoms of persistent blurring or distortion of vision arise as evaluation and possible treatment is necessary to preserve as much vision as possible. Return for follow-up as scheduled. Health Maintenance Due   Topic Date Due    COVID-19 Vaccine (1) Never done       HM updated.  Colonoscopy and pathology reports scanned into chart.     Marlee Roque LPN   Clinical Care Coordinator  Primary Care and Wellness

## 2023-02-16 DIAGNOSIS — Z00.00 ROUTINE GENERAL MEDICAL EXAMINATION AT A HEALTH CARE FACILITY: Primary | ICD-10-CM

## 2023-04-06 ENCOUNTER — OFFICE VISIT (OUTPATIENT)
Dept: INTERNAL MEDICINE | Facility: CLINIC | Age: 73
End: 2023-04-06
Payer: MEDICARE

## 2023-04-06 ENCOUNTER — HOSPITAL ENCOUNTER (OUTPATIENT)
Dept: CARDIOLOGY | Facility: CLINIC | Age: 73
Discharge: HOME | End: 2023-04-06
Payer: MEDICARE

## 2023-04-06 ENCOUNTER — CLINICAL SUPPORT (OUTPATIENT)
Dept: INTERNAL MEDICINE | Facility: CLINIC | Age: 73
End: 2023-04-06
Payer: MEDICARE

## 2023-04-06 VITALS
WEIGHT: 220 LBS | HEIGHT: 73 IN | DIASTOLIC BLOOD PRESSURE: 70 MMHG | BODY MASS INDEX: 29.16 KG/M2 | SYSTOLIC BLOOD PRESSURE: 125 MMHG

## 2023-04-06 DIAGNOSIS — I47.29 PAROXYSMAL VT: ICD-10-CM

## 2023-04-06 DIAGNOSIS — Z12.5 SPECIAL SCREENING FOR MALIGNANT NEOPLASM OF PROSTATE: ICD-10-CM

## 2023-04-06 DIAGNOSIS — K63.5 POLYP OF COLON, UNSPECIFIED PART OF COLON, UNSPECIFIED TYPE: ICD-10-CM

## 2023-04-06 DIAGNOSIS — J31.0 RHINITIS, UNSPECIFIED TYPE: ICD-10-CM

## 2023-04-06 DIAGNOSIS — Z00.00 ROUTINE GENERAL MEDICAL EXAMINATION AT A HEALTH CARE FACILITY: ICD-10-CM

## 2023-04-06 DIAGNOSIS — R53.0 NEOPLASTIC MALIGNANT RELATED FATIGUE: ICD-10-CM

## 2023-04-06 DIAGNOSIS — R79.9 ABNORMAL BLOOD CHEMISTRY: ICD-10-CM

## 2023-04-06 DIAGNOSIS — Z00.00 ANNUAL PHYSICAL EXAM: Primary | ICD-10-CM

## 2023-04-06 DIAGNOSIS — E78.5 HYPERLIPIDEMIA, UNSPECIFIED HYPERLIPIDEMIA TYPE: Primary | ICD-10-CM

## 2023-04-06 DIAGNOSIS — E78.2 MIXED HYPERLIPIDEMIA: ICD-10-CM

## 2023-04-06 DIAGNOSIS — C44.90 SKIN CANCER: ICD-10-CM

## 2023-04-06 DIAGNOSIS — R53.83 FATIGUE, UNSPECIFIED TYPE: ICD-10-CM

## 2023-04-06 DIAGNOSIS — R73.09 IMPAIRED GLUCOSE TOLERANCE TEST: ICD-10-CM

## 2023-04-06 LAB
ALBUMIN SERPL BCP-MCNC: 3.9 G/DL (ref 3.5–5.2)
ALP SERPL-CCNC: 53 U/L (ref 55–135)
ALT SERPL W/O P-5'-P-CCNC: 15 U/L (ref 10–44)
ANION GAP SERPL CALC-SCNC: 11 MMOL/L (ref 8–16)
AST SERPL-CCNC: 17 U/L (ref 10–40)
BILIRUB SERPL-MCNC: 0.7 MG/DL (ref 0.1–1)
BUN SERPL-MCNC: 19 MG/DL (ref 8–23)
CALCIUM SERPL-MCNC: 9.8 MG/DL (ref 8.7–10.5)
CHLORIDE SERPL-SCNC: 108 MMOL/L (ref 95–110)
CHOLEST SERPL-MCNC: 123 MG/DL (ref 120–199)
CHOLEST/HDLC SERPL: 2.9 {RATIO} (ref 2–5)
CO2 SERPL-SCNC: 28 MMOL/L (ref 23–29)
COMPLEXED PSA SERPL-MCNC: 0.51 NG/ML (ref 0–4)
CREAT SERPL-MCNC: 1.2 MG/DL (ref 0.5–1.4)
ERYTHROCYTE [DISTWIDTH] IN BLOOD BY AUTOMATED COUNT: 12.5 % (ref 11.5–14.5)
EST. GFR  (NO RACE VARIABLE): >60 ML/MIN/1.73 M^2
ESTIMATED AVG GLUCOSE: 103 MG/DL (ref 68–131)
GLUCOSE SERPL-MCNC: 100 MG/DL (ref 70–110)
HBA1C MFR BLD: 5.2 % (ref 4–5.6)
HCT VFR BLD AUTO: 44.9 % (ref 40–54)
HDLC SERPL-MCNC: 43 MG/DL (ref 40–75)
HDLC SERPL: 35 % (ref 20–50)
HGB BLD-MCNC: 14.9 G/DL (ref 14–18)
LDLC SERPL CALC-MCNC: 67.8 MG/DL (ref 63–159)
MCH RBC QN AUTO: 31.5 PG (ref 27–31)
MCHC RBC AUTO-ENTMCNC: 33.2 G/DL (ref 32–36)
MCV RBC AUTO: 95 FL (ref 82–98)
NONHDLC SERPL-MCNC: 80 MG/DL
PLATELET # BLD AUTO: 193 K/UL (ref 150–450)
PMV BLD AUTO: 10.2 FL (ref 9.2–12.9)
POTASSIUM SERPL-SCNC: 4.5 MMOL/L (ref 3.5–5.1)
PROT SERPL-MCNC: 7 G/DL (ref 6–8.4)
RBC # BLD AUTO: 4.73 M/UL (ref 4.6–6.2)
SODIUM SERPL-SCNC: 147 MMOL/L (ref 136–145)
TRIGL SERPL-MCNC: 61 MG/DL (ref 30–150)
TSH SERPL DL<=0.005 MIU/L-ACNC: 1.15 UIU/ML (ref 0.4–4)
WBC # BLD AUTO: 5.08 K/UL (ref 3.9–12.7)

## 2023-04-06 PROCEDURE — 99999 PR PBB SHADOW E&M-EST. PATIENT-LVL IV: CPT | Mod: PBBFAC,,, | Performed by: INTERNAL MEDICINE

## 2023-04-06 PROCEDURE — 80053 COMPREHEN METABOLIC PANEL: CPT | Performed by: INTERNAL MEDICINE

## 2023-04-06 PROCEDURE — 83036 HEMOGLOBIN GLYCOSYLATED A1C: CPT | Performed by: INTERNAL MEDICINE

## 2023-04-06 PROCEDURE — 99999 PR PBB SHADOW E&M-EST. PATIENT-LVL IV: ICD-10-PCS | Mod: PBBFAC,,, | Performed by: INTERNAL MEDICINE

## 2023-04-06 PROCEDURE — 93010 EKG 12-LEAD: ICD-10-PCS | Mod: S$PBB,,, | Performed by: INTERNAL MEDICINE

## 2023-04-06 PROCEDURE — 99397 PR PREVENTIVE VISIT,EST,65 & OVER: ICD-10-PCS | Mod: S$PBB,GZ,, | Performed by: INTERNAL MEDICINE

## 2023-04-06 PROCEDURE — 99397 PER PM REEVAL EST PAT 65+ YR: CPT | Mod: S$PBB,GZ,, | Performed by: INTERNAL MEDICINE

## 2023-04-06 PROCEDURE — 93010 ELECTROCARDIOGRAM REPORT: CPT | Mod: S$PBB,,, | Performed by: INTERNAL MEDICINE

## 2023-04-06 PROCEDURE — 93005 ELECTROCARDIOGRAM TRACING: CPT | Mod: PBBFAC | Performed by: INTERNAL MEDICINE

## 2023-04-06 PROCEDURE — 80061 LIPID PANEL: CPT | Performed by: INTERNAL MEDICINE

## 2023-04-06 PROCEDURE — 84443 ASSAY THYROID STIM HORMONE: CPT | Performed by: INTERNAL MEDICINE

## 2023-04-06 PROCEDURE — 85027 COMPLETE CBC AUTOMATED: CPT | Performed by: INTERNAL MEDICINE

## 2023-04-06 PROCEDURE — 84153 ASSAY OF PSA TOTAL: CPT | Performed by: INTERNAL MEDICINE

## 2023-04-06 PROCEDURE — 99214 OFFICE O/P EST MOD 30 MIN: CPT | Mod: PBBFAC | Performed by: INTERNAL MEDICINE

## 2023-04-06 RX ORDER — FLUTICASONE PROPIONATE 50 MCG
1 SPRAY, SUSPENSION (ML) NASAL DAILY
Qty: 16 ML | Refills: 12 | Status: SHIPPED | OUTPATIENT
Start: 2023-04-06

## 2023-04-06 NOTE — LETTER
April 6, 2023    Rakesh Lawrence  [address] [place] [address]             Omar Humphries [facility]  [address]  [facility] [address]  Phone: [phone]  Fax: [phone] Dear Duy Howard:    Thank you for allowing me to serve you and perform your Executive Health exam on 4/6/2023.  This letter will serve a brief summary of the history, physical findings, and laboratory/studies performed and recommendations at that time.    Reason for Visit: Executive Health Preventive Physical Examination    Past Medical History:   Diagnosis Date    Cholelithiasis 2011 12/05/2013    Colon polyp: adenoma 2007; [place] wnl 1/11 in [place] due in 2014 12/05/2013    Elevated bilirubin since 2002 (1.3) 12/05/2013    Hyperlipidemia     Paroxysmal VT 01/04/2013    Pneumococcal vaccination declined 11/30/2017    TIA (transient ischemic attack) 2014 workup in [place] negative 11/13/2014       Past Surgical History:   Procedure Laterality Date    EYE SURGERY      TONSILLECTOMY         Family History   Problem Relation Age of Onset    Heart disease Mother     Stroke Mother     Hypertension Mother     Cancer Father         lung     Cancer Sister         ? lung    COPD Sister     No Known Problems Daughter     No Known Problems Daughter     No Known Problems Daughter     Stroke Paternal Uncle     Hypertension Paternal Uncle        Social History     Socioeconomic History    Marital status:     Number of children: 3   Tobacco Use    Smoking status: Never    Smokeless tobacco: Never   Substance and Sexual Activity    Alcohol use: Yes     Comment: occasional    Drug use: No    Sexual activity: Yes     Partners: Female        Review of patient's allergies indicates:  No Known Allergies      Current Outpatient Medications:     ascorbic acid, vitamin C, (VITAMIN C) 1000 MG tablet, Take 1,000 mg by mouth once daily., Disp: , Rfl:     aspirin (ECOTRIN) 81 MG EC tablet, Take 1 tablet (81 mg total)  by mouth once daily., Disp: 30 tablet, Rfl: 12    clopidogrel (PLAVIX) 75 mg tablet, Take 75 mg by mouth once daily., Disp: , Rfl:     omega-3 fatty acids/fish oil (FISH OIL-OMEGA-3 FATTY ACIDS) 300-1,000 mg capsule, Take by mouth once daily., Disp: , Rfl:     pravastatin (PRAVACHOL) 80 MG tablet, TAKE 1 TABLET (80 MG TOTAL) BY MOUTH ONCE DAILY., Disp: 90 tablet, Rfl: 2    vitamin D (VITAMIN D3) 1000 units Tab, Take 1 tablet (1,000 Units total) by mouth once daily., Disp: 30 tablet, Rfl: 12    zinc sulfate (ZINC-220 ORAL), Take by mouth., Disp: , Rfl:     Review of Systems  Review of Systems - Negative except for rhinitis    Physical Exam:  General: General appearance: alert, well appearing, and in no distress.   Skin: Skin exam - normal coloration and turgor, no rashes, no suspicious skin lesions noted.  HEENT: Ears - bilateral TM's and external ear canals normal. , ENT exam reveals - ENT exam normal, no neck nodes or sinus tenderness.   Lungs: Chest: clear to auscultation, no wheezes, rales or rhonchi, symmetric air entry.   Heart: CVS exam: normal rate, regular rhythm, normal S1, S2, no murmurs, rubs, clicks or gallops.   Extremities: Exam of extremities: peripheral pulses normal, no pedal edema, no clubbing or cyanosis    Labs:  Results for orders placed or performed in visit on 03/08/22   Comprehensive metabolic panel   Result Value Ref Range    Sodium 143 136 - 145 mmol/L    Potassium 4.2 3.5 - 5.1 mmol/L    Chloride 105 95 - 110 mmol/L    CO2 27 23 - 29 mmol/L    Glucose 97 70 - 110 mg/dL    BUN 16 8 - 23 mg/dL    Creatinine 0.8 0.5 - 1.4 mg/dL    Calcium 9.4 8.7 - 10.5 mg/dL    Total Protein 6.9 6.0 - 8.4 g/dL    Albumin 3.7 3.5 - 5.2 g/dL    Total Bilirubin 1.0 0.1 - 1.0 mg/dL    Alkaline Phosphatase 51 (L) 55 - 135 U/L    AST 24 10 - 40 U/L    ALT 22 10 - 44 U/L    Anion Gap 11 8 - 16 mmol/L    eGFR if African American >60.0 >60 mL/min/1.73 m^2    eGFR if non African American >60.0 >60 mL/min/1.73 m^2    CBC Without Differential   Result Value Ref Range    WBC 6.28 3.90 - 12.70 K/uL    RBC 4.74 4.60 - 6.20 M/uL    Hemoglobin 14.9 14.0 - 18.0 g/dL    Hematocrit 45.4 40.0 - 54.0 %    MCV 96 82 - 98 fL    MCH 31.4 (H) 27.0 - 31.0 pg    MCHC 32.8 32.0 - 36.0 g/dL    RDW 12.8 11.5 - 14.5 %    Platelets 182 150 - 450 K/uL    MPV 10.1 9.2 - 12.9 fL   Lipid panel   Result Value Ref Range    Cholesterol 136 120 - 199 mg/dL    Triglycerides 52 30 - 150 mg/dL    HDL 51 40 - 75 mg/dL    LDL Cholesterol 74.6 63.0 - 159.0 mg/dL    HDL/Cholesterol Ratio 37.5 20.0 - 50.0 %    Total Cholesterol/HDL Ratio 2.7 2.0 - 5.0    Non-HDL Cholesterol 85 mg/dL   TSH   Result Value Ref Range    TSH 1.567 0.400 - 4.000 uIU/mL   PSA, Screening (every year)   Result Value Ref Range    PSA, Screen 0.59 0.00 - 4.00 ng/mL   Hemoglobin A1c   Result Value Ref Range    Hemoglobin A1C 5.2 4.0 - 5.6 %    Estimated Avg Glucose 103 68 - 131 mg/dL      Labs acceptable.  EKG likewise acceptable.      Assessment/Recommendations:  Routine Health Maintenance:  Is up-to-date.  I recommend an annual flu shot.    At this time, you appear to be in good medical condition.  I look forward to seeing you again next year.  You can try the Flonase over-the-counter for your allergic symptoms.  If symptoms continue to be problematic, do not hesitate to let me know and we can consider an ENT or allergy assessment.    Please be sure to keep your Cardiology and Dermatology appointments and to maintain your regular exercise habits.    Please contact me should you have any questions or concerns regarding physical findings, or my recommendations.      Sincerely,          Naheed Rodriguez MD, FACP

## 2023-04-06 NOTE — PROGRESS NOTES
Patient ID: Rakesh Lawrence is a 73 y.o. male.    Chief Complaint: EH PE      Assessment:       1. Annual physical exam    2. Paroxysmal VT    3. Mixed hyperlipidemia    4. Skin cancer: on face- follows in [place]    5. Polyp of colon, unspecified part of colon, unspecified type    6. Rhinitis, unspecified type          Plan:         1. Annual physical exam    2. 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 Mixed hyperlipidemia    4. Skin cancer: on face- follows in [place]    5. Polyp of colon, unspecified part of colon, unspecified type  Overview:  Colon polyp: adenoma 2007; repeated wnl 1/11 in [place] also wnl in 2014, also 2017, 2020 told 2025      6. Rhinitis, unspecified type  -     fluticasone propionate (FLONASE) 50 mcg/actuation nasal spray; 1 spray (50 mcg total) by Each Nostril route once daily.  Dispense: 16 mL; Refill: 12     Continue with exercise and healthy habits   Keep Cardiology in Dermatology follow-ups   Up-to-date with colon cancer screenings   Flu shot annually   COVID booster discussed, he might consider at a later point   I will review all studies and determine further tx depending on findings       Subjective:   Executive physical      Labs today pending; labs in January in [place] were normal.  He feels well.  He plays golf several times a week.  Denies syncope or chest pain.    EKG acceptable    Follows with Cardiology, Dermatology and colon evaluations in [place].    Immunizations reviewed.  He has had 3 COVID vaccines in [place].  He gets his flu shot annually.    Some sinus sx- uses Allegra, Mucinex.  Some post nasal drip.  No fevers, chills or sweats.  No shortness of breath.  Seems to be allergic in nature.    Patient Active Problem List   Diagnosis    Abnormal stress echocardiogram: 2012, acceptable nuclear stress test 2012    Mixed hyperlipidemia    Paroxysmal VT    Cholelithiasis 2007 see ultrasound no sx    Colon polyp:  adenoma 2007; 1/11 also 2014 and 2017 repeated 2020; told 5 year follow up ([place])    Elevated bilirubin since 2002 (1.3)    TIA (transient ischemic attack) 2014 workup in [place] negative    Skin cancer: on face- follows in [place]        Review of Systems   Constitutional: Negative.    HENT:  Positive for postnasal drip and rhinorrhea. Negative for sinus pressure and sinus pain.         Rhinitis, postnasal drip   Eyes:  Negative for visual disturbance.   Respiratory:  Negative for apnea, choking, shortness of breath and stridor.    Cardiovascular:  Negative for chest pain.   Gastrointestinal:  Negative for abdominal pain, constipation and diarrhea.   Genitourinary:  Negative for difficulty urinating, flank pain, frequency, testicular pain and urgency.   Musculoskeletal:  Negative for arthralgias and back pain.   Skin:  Negative for color change and rash.   Neurological: Negative.    Psychiatric/Behavioral: Negative.         Objective:      Physical Exam  Constitutional:       Appearance: He is well-developed.   HENT:      Head: Normocephalic and atraumatic.      Right Ear: External ear normal.      Left Ear: External ear normal.   Eyes:      Extraocular Movements: Extraocular movements intact.      Conjunctiva/sclera: Conjunctivae normal.   Neck:      Thyroid: No thyromegaly.   Cardiovascular:      Rate and Rhythm: Normal rate and regular rhythm.      Heart sounds: No murmur heard.  Pulmonary:      Effort: Pulmonary effort is normal. No respiratory distress.      Breath sounds: Normal breath sounds. No wheezing.   Abdominal:      General: There is no distension.      Palpations: Abdomen is soft.      Tenderness: There is no abdominal tenderness.   Musculoskeletal:         General: No tenderness.      Cervical back: Normal range of motion and neck supple.      Right lower leg: No edema.      Left lower leg: No edema.   Lymphadenopathy:      Cervical: No cervical adenopathy.   Skin:     General: Skin is warm and dry.    Neurological:      General: No focal deficit present.      Mental Status: He is alert and oriented to person, place, and time.      Cranial Nerves: No cranial nerve deficit.   Psychiatric:         Mood and Affect: Mood normal.         Behavior: Behavior normal.         Thought Content: Thought content normal.         Judgment: Judgment normal.           Health Maintenance Due   Topic Date Due    COVID-19 Vaccine (1) Never done    PROSTATE-SPECIFIC ANTIGEN  03/08/2023

## 2024-05-15 DIAGNOSIS — Z00.00 ROUTINE GENERAL MEDICAL EXAMINATION AT A HEALTH CARE FACILITY: Primary | ICD-10-CM

## 2024-05-23 DIAGNOSIS — Z00.00 ANNUAL PHYSICAL EXAM: ICD-10-CM

## 2024-05-23 RX ORDER — PRAVASTATIN SODIUM 80 MG/1
80 TABLET ORAL DAILY
Qty: 90 TABLET | Refills: 2 | Status: SHIPPED | OUTPATIENT
Start: 2024-05-23 | End: 2025-05-23

## 2024-06-10 ENCOUNTER — PATIENT MESSAGE (OUTPATIENT)
Dept: INTERNAL MEDICINE | Facility: CLINIC | Age: 74
End: 2024-06-10
Payer: MEDICARE

## 2024-08-22 ENCOUNTER — OFFICE VISIT (OUTPATIENT)
Dept: INTERNAL MEDICINE | Facility: CLINIC | Age: 74
End: 2024-08-22
Payer: MEDICARE

## 2024-08-22 ENCOUNTER — CLINICAL SUPPORT (OUTPATIENT)
Dept: INTERNAL MEDICINE | Facility: CLINIC | Age: 74
End: 2024-08-22
Payer: MEDICARE

## 2024-08-22 ENCOUNTER — HOSPITAL ENCOUNTER (OUTPATIENT)
Dept: CARDIOLOGY | Facility: CLINIC | Age: 74
Discharge: HOME | End: 2024-08-22
Payer: MEDICARE

## 2024-08-22 VITALS
WEIGHT: 217.38 LBS | HEIGHT: 73 IN | DIASTOLIC BLOOD PRESSURE: 80 MMHG | BODY MASS INDEX: 28.81 KG/M2 | SYSTOLIC BLOOD PRESSURE: 125 MMHG

## 2024-08-22 DIAGNOSIS — Z12.5 SPECIAL SCREENING FOR MALIGNANT NEOPLASM OF PROSTATE: ICD-10-CM

## 2024-08-22 DIAGNOSIS — E78.2 MIXED HYPERLIPIDEMIA: ICD-10-CM

## 2024-08-22 DIAGNOSIS — K63.5 POLYP OF COLON, UNSPECIFIED PART OF COLON, UNSPECIFIED TYPE: ICD-10-CM

## 2024-08-22 DIAGNOSIS — C44.90 SKIN CANCER: ICD-10-CM

## 2024-08-22 DIAGNOSIS — Z00.00 ROUTINE GENERAL MEDICAL EXAMINATION AT A HEALTH CARE FACILITY: ICD-10-CM

## 2024-08-22 DIAGNOSIS — I47.29 PAROXYSMAL VT: Primary | ICD-10-CM

## 2024-08-22 DIAGNOSIS — R73.09 IMPAIRED GLUCOSE TOLERANCE TEST: ICD-10-CM

## 2024-08-22 DIAGNOSIS — E78.5 HYPERLIPIDEMIA, UNSPECIFIED HYPERLIPIDEMIA TYPE: Primary | ICD-10-CM

## 2024-08-22 DIAGNOSIS — Z00.00 ANNUAL PHYSICAL EXAM: ICD-10-CM

## 2024-08-22 DIAGNOSIS — R53.83 FATIGUE, UNSPECIFIED TYPE: ICD-10-CM

## 2024-08-22 DIAGNOSIS — R79.9 ABNORMAL BLOOD CHEMISTRY: ICD-10-CM

## 2024-08-22 DIAGNOSIS — R53.0 NEOPLASTIC MALIGNANT RELATED FATIGUE: ICD-10-CM

## 2024-08-22 LAB
ALBUMIN SERPL BCP-MCNC: 3.8 G/DL (ref 3.5–5.2)
ALP SERPL-CCNC: 51 U/L (ref 55–135)
ALT SERPL W/O P-5'-P-CCNC: 23 U/L (ref 10–44)
ANION GAP SERPL CALC-SCNC: 5 MMOL/L (ref 8–16)
AST SERPL-CCNC: 21 U/L (ref 10–40)
BILIRUB SERPL-MCNC: 1 MG/DL (ref 0.1–1)
BUN SERPL-MCNC: 12 MG/DL (ref 8–23)
CALCIUM SERPL-MCNC: 9.5 MG/DL (ref 8.7–10.5)
CHLORIDE SERPL-SCNC: 111 MMOL/L (ref 95–110)
CHOLEST SERPL-MCNC: 126 MG/DL (ref 120–199)
CHOLEST/HDLC SERPL: 2.6 {RATIO} (ref 2–5)
CO2 SERPL-SCNC: 29 MMOL/L (ref 23–29)
COMPLEXED PSA SERPL-MCNC: 0.6 NG/ML (ref 0–4)
CREAT SERPL-MCNC: 1 MG/DL (ref 0.5–1.4)
ERYTHROCYTE [DISTWIDTH] IN BLOOD BY AUTOMATED COUNT: 12.3 % (ref 11.5–14.5)
EST. GFR  (NO RACE VARIABLE): >60 ML/MIN/1.73 M^2
ESTIMATED AVG GLUCOSE: 97 MG/DL (ref 68–131)
GLUCOSE SERPL-MCNC: 98 MG/DL (ref 70–110)
HBA1C MFR BLD: 5 % (ref 4–5.6)
HCT VFR BLD AUTO: 45.7 % (ref 40–54)
HDLC SERPL-MCNC: 48 MG/DL (ref 40–75)
HDLC SERPL: 38.1 % (ref 20–50)
HGB BLD-MCNC: 15.2 G/DL (ref 14–18)
LDLC SERPL CALC-MCNC: 66.6 MG/DL (ref 63–159)
MCH RBC QN AUTO: 31.9 PG (ref 27–31)
MCHC RBC AUTO-ENTMCNC: 33.3 G/DL (ref 32–36)
MCV RBC AUTO: 96 FL (ref 82–98)
NONHDLC SERPL-MCNC: 78 MG/DL
OHS QRS DURATION: 88 MS
OHS QTC CALCULATION: 394 MS
PLATELET # BLD AUTO: 170 K/UL (ref 150–450)
PMV BLD AUTO: 9.8 FL (ref 9.2–12.9)
POTASSIUM SERPL-SCNC: 4.7 MMOL/L (ref 3.5–5.1)
PROT SERPL-MCNC: 7 G/DL (ref 6–8.4)
RBC # BLD AUTO: 4.76 M/UL (ref 4.6–6.2)
SODIUM SERPL-SCNC: 145 MMOL/L (ref 136–145)
TRIGL SERPL-MCNC: 57 MG/DL (ref 30–150)
TSH SERPL DL<=0.005 MIU/L-ACNC: 1.11 UIU/ML (ref 0.4–4)
WBC # BLD AUTO: 5.5 K/UL (ref 3.9–12.7)

## 2024-08-22 PROCEDURE — 99999 PR PBB SHADOW E&M-EST. PATIENT-LVL IV: CPT | Mod: PBBFAC,,, | Performed by: INTERNAL MEDICINE

## 2024-08-22 PROCEDURE — 99214 OFFICE O/P EST MOD 30 MIN: CPT | Mod: PBBFAC,25 | Performed by: INTERNAL MEDICINE

## 2024-08-22 PROCEDURE — 83036 HEMOGLOBIN GLYCOSYLATED A1C: CPT | Performed by: INTERNAL MEDICINE

## 2024-08-22 PROCEDURE — 80061 LIPID PANEL: CPT | Performed by: INTERNAL MEDICINE

## 2024-08-22 PROCEDURE — 84153 ASSAY OF PSA TOTAL: CPT | Performed by: INTERNAL MEDICINE

## 2024-08-22 PROCEDURE — 80053 COMPREHEN METABOLIC PANEL: CPT | Performed by: INTERNAL MEDICINE

## 2024-08-22 PROCEDURE — 85027 COMPLETE CBC AUTOMATED: CPT | Performed by: INTERNAL MEDICINE

## 2024-08-22 PROCEDURE — 84443 ASSAY THYROID STIM HORMONE: CPT | Performed by: INTERNAL MEDICINE

## 2024-08-22 PROCEDURE — 93010 ELECTROCARDIOGRAM REPORT: CPT | Mod: S$PBB,,, | Performed by: INTERNAL MEDICINE

## 2024-08-22 PROCEDURE — 93005 ELECTROCARDIOGRAM TRACING: CPT | Mod: PBBFAC | Performed by: INTERNAL MEDICINE

## 2024-08-22 RX ORDER — PRAVASTATIN SODIUM 80 MG/1
80 TABLET ORAL DAILY
Qty: 90 TABLET | Refills: 3 | Status: SHIPPED | OUTPATIENT
Start: 2024-08-22 | End: 2025-08-22

## 2024-08-22 RX ORDER — CELECOXIB 200 MG/1
200 CAPSULE ORAL
COMMUNITY
Start: 2024-03-20

## 2024-08-22 RX ORDER — IMIQUIMOD 12.5 MG/.25G
CREAM TOPICAL
COMMUNITY
Start: 2024-03-14

## 2024-08-22 NOTE — LETTER
August 22, 2024    Rakesh Lawrence  [address] [place] [address]      Dear Rakesh Lawrence,    On 8/22/2024, it was a pleasure to see you and perform your Executive Health evaluation. Your Primary Care physician is Naheed Rodriguez MD. At the time of this visit, you are 74 y.o..  You denied any specific complaints or concerns during todays visit.        YOUR PAST MEDICAL HISTORY:  has a past medical history of Cholelithiasis 2011, Colon polyp: adenoma 2007; repeated wnl 1/11 in [place] due in 2014, Elevated bilirubin since 2002 (1.3), Hyperlipidemia, Paroxysmal VT, Pneumococcal vaccination declined, and TIA (transient ischemic attack) 2014 workup in [place] negative..    YOUR PAST SURGICAL HISTORY:  has a past surgical history that includes Tonsillectomy and Eye surgery..    YOUR CURRENT MEDICATIONS:   Current Outpatient Medications:     ascorbic acid, vitamin C, (VITAMIN C) 1000 MG tablet, Take 1,000 mg by mouth once daily., Disp: , Rfl:     aspirin (ECOTRIN) 81 MG EC tablet, Take 1 tablet (81 mg total) by mouth once daily., Disp: 30 tablet, Rfl: 12    celecoxib (CELEBREX) 200 MG capsule, Take 200 mg by mouth., Disp: , Rfl:     clopidogrel (PLAVIX) 75 mg tablet, Take 75 mg by mouth once daily., Disp: , Rfl:     fluticasone propionate (FLONASE) 50 mcg/actuation nasal spray, 1 spray (50 mcg total) by Each Nostril route once daily., Disp: 16 mL, Rfl: 12    imiquimod (ALDARA) 5 % cream, Apply topically., Disp: , Rfl:     omega-3 fatty acids/fish oil (FISH OIL-OMEGA-3 FATTY ACIDS) 300-1,000 mg capsule, Take by mouth once daily., Disp: , Rfl:     vitamin D (VITAMIN D3) 1000 units Tab, Take 1 tablet (1,000 Units total) by mouth once daily., Disp: 30 tablet, Rfl: 12    zinc sulfate (ZINC-220 ORAL), Take by mouth., Disp: , Rfl:     pravastatin (PRAVACHOL) 80 MG tablet, Take 1 tablet (80 mg total) by mouth once daily., Disp: 90 tablet, Rfl: 3.    YOUR KNOWN DRUG ALLERGIES:  has No Known Allergies.    YOUR SOCIAL HISTORY:   "reports that he has never smoked. He has never used smokeless tobacco. He reports current alcohol use. He reports that he does not use drugs..    YOUR FAMILY HISTORY: family history includes COPD in his sister; Cancer in his father and sister; Heart disease in his mother; Hypertension in his mother and paternal uncle; No Known Problems in his daughter, daughter, and daughter; Stroke in his mother and paternal uncle..     YOUR ROUTINE HEALTH MAINTENANCE includes   Health Maintenance   Topic Date Due    PROSTATE-SPECIFIC ANTIGEN  08/22/2025    High Dose Statin  08/22/2025    Colorectal Cancer Screening  11/12/2025    Lipid Panel  08/22/2029    TETANUS VACCINE  03/08/2032    Hepatitis C Screening  Completed    Shingles Vaccine  Completed   .    PHYSICAL EXAMINATION:  Vitals:    08/22/24 0927   BP: 125/80   BP Location: Right arm   Patient Position: Sitting   BP Method: Medium (Manual)   Weight: 98.6 kg (217 lb 6 oz)   Height: 6' 1" (1.854 m)     These are normal vital signs. Other than your weight, your physical examination did not reveal any additional significant abnormal findings.    YOUR BLOOD WORK AND ADDITIONAL TESTS:  Labs acceptable.  EKG was likewise acceptable.      In summary, you are overall in good health.   Please be sure to keep up with your cardiology and Dermatology follow-ups.  You will be due for colonoscopy next year which you indicated you will schedule in [place].    I recommend you consider the RSV vaccine which is a one time vaccine as well as an annual flu shot.  You are considering whether or not you want to get another COVID booster.    Please continue to work on sleep hygiene, exercise and weight loss of about 5-7 lb in the next year.    It was a pleasure to see you and perform this Executive Health evaluation. If I can be of any further assistance, or if you have any additional questions or concerns, please do not hesitate to let me know.    Sincerely,      Naheed Rodriguez MD        "

## 2024-08-22 NOTE — PROGRESS NOTES
Patient ID: Rakesh Lawrence is a 74 y.o. male.    Chief Complaint: [employer]      Assessment:       1. Paroxysmal VT    2. Annual physical exam    3. Mixed hyperlipidemia    4. Skin cancer: on face- follows in [place]    5. Polyp of colon, unspecified part of colon, unspecified type          Plan:           1. Paroxysmal VT  Overview:  Exercise induced MVT, LBBB in V1, RBBB in V2, deepest S in V3, apical origin      2. Annual physical exam  -     pravastatin (PRAVACHOL) 80 MG tablet; Take 1 tablet (80 mg total) by mouth once daily.  Dispense: 90 tablet; Refill: 3    3. Mixed hyperlipidemia    4. Skin cancer: on face- follows in [place]    5. Polyp of colon, unspecified part of colon, unspecified type  Overview:  Colon polyp: adenoma 2007; [place] wnl 1/11 [place] also wnl in 2014, also 2017, 2020 told 2025         Continue current regimen  Exercise, sleep hygiene reviewed  Cardiology and Dermatology follow-ups  He will schedule his colonoscopy next year  Immunizations discussed, he declines COVID booster and RSV vaccine today  Anticipate annual follow up with me, sooner with problems in the interim  Increase hydration, high-fiber diet, exercise; colonoscopy next year    Subjective:   EH PE    Doing well    Labs all good.  Stable on regimen.    Will be due for a colonoscopy next year- he will do in [place].    Sluggish bowels, usually every 3-4 days unless he takes Miralax or Dulcolax.    Sees Dermatology and Cardiology in [place] a couple of times a year- treated for some small skin cancers.        Patient Active Problem List   Diagnosis    Abnormal stress echocardiogram: 2012, acceptable nuclear stress test 2012    Mixed hyperlipidemia    Paroxysmal VT    Cholelithiasis 2007 see ultrasound no sx    Colon polyp: adenoma 2007; 1/11 also 2014 and 2017 repeated 2020; told 5 year follow up ([place])    Elevated bilirubin since 2002 (1.3)    TIA (transient ischemic attack) 2014 workup in [place] negative    Skin cancer: on face-  follows in [place]        Review of Systems   Constitutional: Negative.    HENT:  Negative for sinus pressure.    Eyes:  Negative for visual disturbance.   Respiratory:  Negative for apnea and shortness of breath.    Cardiovascular:  Negative for chest pain.   Gastrointestinal:  Negative for abdominal pain, constipation and diarrhea.        Occasional sluggish bowels but no change in bowel habits.  No blood in the stool, black tarry stools, pencil thin stools or abdominal pain   Genitourinary:  Negative for difficulty urinating, flank pain, frequency, testicular pain and urgency.   Musculoskeletal:  Negative for arthralgias and back pain.   Skin:  Negative for color change and rash.   Neurological: Negative.    Psychiatric/Behavioral: Negative.           Objective:      Physical Exam  Constitutional:       Appearance: He is well-developed.   HENT:      Head: Normocephalic and atraumatic.      Right Ear: External ear normal.      Left Ear: External ear normal.   Eyes:      Extraocular Movements: Extraocular movements intact.      Conjunctiva/sclera: Conjunctivae normal.   Neck:      Thyroid: No thyromegaly.   Cardiovascular:      Rate and Rhythm: Normal rate and regular rhythm.      Heart sounds: No murmur heard.  Pulmonary:      Effort: Pulmonary effort is normal. No respiratory distress.      Breath sounds: Normal breath sounds. No wheezing.   Abdominal:      General: There is no distension.      Palpations: Abdomen is soft.      Tenderness: There is no abdominal tenderness.   Musculoskeletal:         General: No tenderness.      Cervical back: Normal range of motion and neck supple.      Right lower leg: No edema.      Left lower leg: No edema.   Lymphadenopathy:      Cervical: No cervical adenopathy.   Skin:     General: Skin is warm and dry.      Comments: Tan skin, no dominant lesions   Neurological:      General: No focal deficit present.      Mental Status: He is alert and oriented to person, place, and time.       Cranial Nerves: No cranial nerve deficit.   Psychiatric:         Mood and Affect: Mood normal.         Behavior: Behavior normal.         Thought Content: Thought content normal.         Judgment: Judgment normal.             There are no preventive care reminders to display for this patient.

## 2024-11-19 ENCOUNTER — TELEPHONE (OUTPATIENT)
Dept: INTERNAL MEDICINE | Facility: CLINIC | Age: 74
End: 2024-11-19
Payer: MEDICARE

## 2024-11-19 NOTE — TELEPHONE ENCOUNTER
----- Message from Geovanna sent at 11/19/2024  8:39 AM CST -----  Contact: [phone] patient  [email or web address]     Patient is calling for Medical Advice regarding: Pt states he would like recommendations to internal PCP in his area, [place], if you have any.     How long has patient had these symptoms: n/a    Pharmacy name and phone#: n/a    Patient wants a call back or thru [facility]: call back    Comments:    Please advise patient replies from provider may take up to 48 hours.

## 2024-11-19 NOTE — TELEPHONE ENCOUNTER
I do not have any recommendations for internal medicine physicians in the [facility]

## 2025-07-14 DIAGNOSIS — R06.00 DYSPNEA, UNSPECIFIED TYPE: Primary | ICD-10-CM

## 2025-07-14 DIAGNOSIS — Z00.00 ROUTINE MEDICAL EXAM: Primary | ICD-10-CM
